# Patient Record
Sex: FEMALE | Race: WHITE | NOT HISPANIC OR LATINO | Employment: PART TIME
[De-identification: names, ages, dates, MRNs, and addresses within clinical notes are randomized per-mention and may not be internally consistent; named-entity substitution may affect disease eponyms.]

---

## 2014-09-15 LAB — HCV AB SER QL: NORMAL

## 2016-01-27 LAB — HM MAMMOGRAPHY BILATERAL: NORMAL

## 2017-10-18 ENCOUNTER — HOSPITAL (OUTPATIENT)
Dept: OTHER | Age: 67
End: 2017-10-18
Attending: PHYSICIAN ASSISTANT

## 2017-10-19 ENCOUNTER — DIAGNOSTIC TRANS (OUTPATIENT)
Dept: OTHER | Age: 67
End: 2017-10-19

## 2017-12-05 ENCOUNTER — OTHER- (OUTPATIENT)
Dept: RURAL CLINIC 1 | Facility: CLINIC | Age: 67
Setting detail: DERMATOLOGY
End: 2017-12-05

## 2017-12-05 DIAGNOSIS — L70.0 ACNE VULGARIS: ICD-10-CM

## 2017-12-05 PROCEDURE — 99202 OFFICE O/P NEW SF 15 MIN: CPT

## 2017-12-05 PROCEDURE — 17110 DESTRUCT B9 LESION 1-14: CPT

## 2018-04-11 ENCOUNTER — HOSPITAL (OUTPATIENT)
Dept: OTHER | Age: 68
End: 2018-04-11
Attending: FAMILY MEDICINE

## 2018-05-08 LAB — HPV16+18+45 E6+E7MRNA CVX NAA+PROBE: NEGATIVE

## 2018-05-11 LAB — CYTOLOGY CVX/VAG DOC THIN PREP: NORMAL

## 2018-05-14 LAB — NONINV COLON CA DNA+OCC BLD SCRN STL QL: NORMAL

## 2018-05-15 LAB — NONINV COLON CA DNA+OCC BLD SCRN STL QL: NEGATIVE

## 2018-06-28 RX ORDER — DOXYCYCLINE HYCLATE 100 MG/1
1 CAPSULE CAPSULE, GELATIN COATED ORAL BID
Qty: 60 | Refills: 1
Start: 2018-06-28

## 2018-06-28 RX ORDER — CLINDAMYCIN PHOSPHATE AND BENZOYL PEROXIDE 10; 25 MG/G; MG/G
1 GM GEL TOPICAL DAILY
Qty: 50 | Refills: 0
Start: 2018-06-28

## 2018-06-28 RX ORDER — SULFACETAMIDE SODIUM, SULFUR 100; 50 MG/G; MG/G
1 APPLICATION CREAM TOPICAL DAILY
Qty: 57 | Refills: 0
Start: 2018-06-28

## 2018-10-01 ENCOUNTER — HOSPITAL (OUTPATIENT)
Dept: OTHER | Age: 68
End: 2018-10-01
Attending: ORTHOPAEDIC SURGERY

## 2019-09-17 ENCOUNTER — OTHER- (OUTPATIENT)
Dept: RURAL CLINIC 1 | Facility: CLINIC | Age: 69
Setting detail: DERMATOLOGY
End: 2019-09-17

## 2019-09-17 DIAGNOSIS — C44.41 BASAL CELL CARCINOMA OF SKIN OF SCALP AND NECK: ICD-10-CM

## 2019-09-17 PROCEDURE — 17110 DESTRUCT B9 LESION 1-14: CPT

## 2019-09-17 PROCEDURE — 99213 OFFICE O/P EST LOW 20 MIN: CPT

## 2020-04-17 ENCOUNTER — HOSPITAL ENCOUNTER (EMERGENCY)
Age: 70
Discharge: HOME | End: 2020-04-17
Attending: EMERGENCY MEDICINE

## 2020-04-17 ENCOUNTER — APPOINTMENT (OUTPATIENT)
Dept: GENERAL RADIOLOGY | Age: 70
End: 2020-04-17
Attending: EMERGENCY MEDICINE

## 2020-04-17 VITALS
HEART RATE: 111 BPM | SYSTOLIC BLOOD PRESSURE: 119 MMHG | WEIGHT: 132 LBS | BODY MASS INDEX: 20.72 KG/M2 | TEMPERATURE: 101.7 F | HEIGHT: 67 IN | OXYGEN SATURATION: 98 % | DIASTOLIC BLOOD PRESSURE: 70 MMHG | RESPIRATION RATE: 20 BRPM

## 2020-04-17 DIAGNOSIS — J40 BRONCHITIS: Primary | ICD-10-CM

## 2020-04-17 LAB
ALBUMIN SERPL-MCNC: 3.7 G/DL (ref 3.6–5.1)
ALBUMIN/GLOB SERPL: 0.8 {RATIO} (ref 1–2.4)
ALP SERPL-CCNC: 103 UNITS/L (ref 45–117)
ALT SERPL-CCNC: 82 UNITS/L
ANION GAP SERPL CALC-SCNC: 11 MMOL/L (ref 10–20)
APTT PPP: 28 SEC (ref 22–32)
AST SERPL-CCNC: 98 UNITS/L
BASOPHILS # BLD: 0 K/MCL (ref 0–0.3)
BASOPHILS NFR BLD: 0 %
BILIRUB SERPL-MCNC: 0.4 MG/DL (ref 0.2–1)
BUN SERPL-MCNC: 17 MG/DL (ref 6–20)
BUN/CREAT SERPL: 16 (ref 7–25)
CALCIUM SERPL-MCNC: 9.2 MG/DL (ref 8.4–10.2)
CHLORIDE SERPL-SCNC: 100 MMOL/L (ref 98–107)
CO2 SERPL-SCNC: 31 MMOL/L (ref 21–32)
CREAT SERPL-MCNC: 1.06 MG/DL (ref 0.51–0.95)
DIFFERENTIAL METHOD BLD: ABNORMAL
EOSINOPHIL # BLD: 0 K/MCL (ref 0.1–0.5)
EOSINOPHIL NFR BLD: 0 %
ERYTHROCYTE [DISTWIDTH] IN BLOOD: 12.7 % (ref 11–15)
GLOBULIN SER-MCNC: 4.6 G/DL (ref 2–4)
GLUCOSE SERPL-MCNC: 115 MG/DL (ref 65–99)
HCT VFR BLD CALC: 41.2 % (ref 36–46.5)
HGB BLD-MCNC: 13.5 G/DL (ref 12–15.5)
IMM GRANULOCYTES # BLD AUTO: 0 K/MCL (ref 0–0.2)
IMM GRANULOCYTES NFR BLD: 0 %
INR PPP: 0.9
LACTATE BLDV-SCNC: 1.2 MMOL/L (ref 0–2)
LYMPHOCYTES # BLD: 0.6 K/MCL (ref 1–4)
LYMPHOCYTES NFR BLD: 12 %
MAGNESIUM SERPL-MCNC: 2.2 MG/DL (ref 1.7–2.4)
MCH RBC QN AUTO: 31.8 PG (ref 26–34)
MCHC RBC AUTO-ENTMCNC: 32.8 G/DL (ref 32–36.5)
MCV RBC AUTO: 96.9 FL (ref 78–100)
MONOCYTES # BLD: 0.2 K/MCL (ref 0.3–0.9)
MONOCYTES NFR BLD: 3 %
NEUTROPHILS # BLD: 4.5 K/MCL (ref 1.8–7.7)
NEUTROPHILS NFR BLD: 85 %
NRBC BLD MANUAL-RTO: 0 /100 WBC
PLATELET # BLD: 196 K/MCL (ref 140–450)
POTASSIUM SERPL-SCNC: 3.9 MMOL/L (ref 3.4–5.1)
PROT SERPL-MCNC: 8.3 G/DL (ref 6.4–8.2)
PROTHROMBIN TIME: 9.9 SEC (ref 9.7–11.8)
RBC # BLD: 4.25 MIL/MCL (ref 4–5.2)
SODIUM SERPL-SCNC: 138 MMOL/L (ref 135–145)
WBC # BLD: 5.4 K/MCL (ref 4.2–11)

## 2020-04-17 PROCEDURE — 10004651 HB RX, NO CHARGE ITEM: Performed by: EMERGENCY MEDICINE

## 2020-04-17 PROCEDURE — 85610 PROTHROMBIN TIME: CPT

## 2020-04-17 PROCEDURE — 85730 THROMBOPLASTIN TIME PARTIAL: CPT

## 2020-04-17 PROCEDURE — 80053 COMPREHEN METABOLIC PANEL: CPT

## 2020-04-17 PROCEDURE — 99284 EMERGENCY DEPT VISIT MOD MDM: CPT

## 2020-04-17 PROCEDURE — 83735 ASSAY OF MAGNESIUM: CPT

## 2020-04-17 PROCEDURE — 87040 BLOOD CULTURE FOR BACTERIA: CPT

## 2020-04-17 PROCEDURE — 83605 ASSAY OF LACTIC ACID: CPT

## 2020-04-17 PROCEDURE — 36415 COLL VENOUS BLD VENIPUNCTURE: CPT

## 2020-04-17 PROCEDURE — 85025 COMPLETE CBC W/AUTO DIFF WBC: CPT

## 2020-04-17 PROCEDURE — 71045 X-RAY EXAM CHEST 1 VIEW: CPT

## 2020-04-17 RX ORDER — ACETAMINOPHEN 500 MG
1000 TABLET ORAL ONCE
Status: COMPLETED | OUTPATIENT
Start: 2020-04-17 | End: 2020-04-17

## 2020-04-17 RX ORDER — NORTRIPTYLINE HYDROCHLORIDE 25 MG/1
25 CAPSULE ORAL NIGHTLY
COMMUNITY
End: 2021-02-15 | Stop reason: ALTCHOICE

## 2020-04-17 RX ADMIN — ACETAMINOPHEN 1000 MG: 500 TABLET ORAL at 13:59

## 2020-04-17 ASSESSMENT — ENCOUNTER SYMPTOMS
HALLUCINATIONS: 0
VOMITING: 0
TROUBLE SWALLOWING: 0
SHORTNESS OF BREATH: 1
DIARRHEA: 0
EYE PAIN: 0
DIZZINESS: 0
COUGH: 1
HEADACHES: 0
ADENOPATHY: 0
RHINORRHEA: 0
NAUSEA: 0
WEAKNESS: 0
CHILLS: 0
FATIGUE: 1
SORE THROAT: 0
FEVER: 1
NUMBNESS: 0

## 2020-04-22 LAB
BACTERIA BLD CULT: NORMAL
BACTERIA BLD CULT: NORMAL
REPORT STATUS (RPT): NORMAL
REPORT STATUS (RPT): NORMAL
SPECIMEN SOURCE: NORMAL
SPECIMEN SOURCE: NORMAL

## 2020-10-06 ENCOUNTER — SKIN CHECK (OUTPATIENT)
Dept: RURAL CLINIC 1 | Facility: CLINIC | Age: 70
Setting detail: DERMATOLOGY
End: 2020-10-06

## 2020-10-06 ENCOUNTER — RX ONLY (RX ONLY)
Age: 70
End: 2020-10-06

## 2020-10-06 DIAGNOSIS — L98.491 NON-PRESSURE CHRONIC ULCER OF SKIN OF OTHER SITES LIMITED TO BREAKDOWN OF SKIN: ICD-10-CM

## 2020-10-06 PROCEDURE — 99214 OFFICE O/P EST MOD 30 MIN: CPT

## 2020-10-06 PROCEDURE — 17110 DESTRUCT B9 LESION 1-14: CPT

## 2020-10-06 RX ORDER — CLOBETASOL PROPIONATE 0.5 MG/ML
1 A SMALL AMOUNT SOLUTION TOPICAL TWICE A DAY
Qty: 50 | Refills: 2
Start: 2020-10-06

## 2020-10-22 ENCOUNTER — EXTERNAL RECORD (OUTPATIENT)
Dept: HEALTH INFORMATION MANAGEMENT | Facility: OTHER | Age: 70
End: 2020-10-22

## 2020-11-04 ENCOUNTER — TELEPHONE (OUTPATIENT)
Dept: SCHEDULING | Age: 70
End: 2020-11-04

## 2020-11-04 DIAGNOSIS — N95.2 VAGINAL ATROPHY: Primary | ICD-10-CM

## 2020-11-05 RX ORDER — ESTRADIOL 0.1 MG/G
1 CREAM VAGINAL
Qty: 1 TUBE | Refills: 2 | Status: SHIPPED | OUTPATIENT
Start: 2020-11-05 | End: 2021-08-30 | Stop reason: SDUPTHER

## 2020-11-05 RX ORDER — ESTRADIOL 0.1 MG/G
1 CREAM VAGINAL
COMMUNITY
End: 2020-11-05 | Stop reason: SDUPTHER

## 2020-11-19 ENCOUNTER — WALK IN (OUTPATIENT)
Dept: URGENT CARE | Age: 70
End: 2020-11-19
Attending: FAMILY MEDICINE

## 2020-11-19 DIAGNOSIS — G89.29 CHRONIC NONINTRACTABLE HEADACHE, UNSPECIFIED HEADACHE TYPE: ICD-10-CM

## 2020-11-19 DIAGNOSIS — R51.9 CHRONIC NONINTRACTABLE HEADACHE, UNSPECIFIED HEADACHE TYPE: ICD-10-CM

## 2020-11-19 DIAGNOSIS — Z20.822 SUSPECTED COVID-19 VIRUS INFECTION: Primary | ICD-10-CM

## 2020-11-19 LAB — GLUCOSE BLDC GLUCOMTR-MCNC: 75 MG/DL (ref 70–99)

## 2020-11-19 PROCEDURE — U0003 INFECTIOUS AGENT DETECTION BY NUCLEIC ACID (DNA OR RNA); SEVERE ACUTE RESPIRATORY SYNDROME CORONAVIRUS 2 (SARS-COV-2) (CORONAVIRUS DISEASE [COVID-19]), AMPLIFIED PROBE TECHNIQUE, MAKING USE OF HIGH THROUGHPUT TECHNOLOGIES AS DESCRIBED BY CMS-2020-01-R: HCPCS

## 2020-11-19 PROCEDURE — C9803 HOPD COVID-19 SPEC COLLECT: HCPCS

## 2020-11-19 PROCEDURE — 82962 GLUCOSE BLOOD TEST: CPT

## 2020-11-19 PROCEDURE — 99212 OFFICE O/P EST SF 10 MIN: CPT

## 2020-11-19 ASSESSMENT — ENCOUNTER SYMPTOMS
HEADACHES: 0
FEVER: 0
SORE THROAT: 0
SHORTNESS OF BREATH: 0
CHILLS: 0
COUGH: 0
ABDOMINAL PAIN: 0
DIZZINESS: 0
BLURRED VISION: 0
VOMITING: 0
DIARRHEA: 0
NAUSEA: 0

## 2020-11-19 ASSESSMENT — PAIN SCALES - GENERAL: PAINLEVEL: 0

## 2020-11-20 ENCOUNTER — TELEPHONE (OUTPATIENT)
Dept: SCHEDULING | Age: 70
End: 2020-11-20

## 2020-11-22 LAB
SARS-COV-2 RNA RESP QL NAA+PROBE: NOT DETECTED
SPECIMEN SOURCE: NORMAL

## 2020-12-08 ENCOUNTER — TELEPHONE (OUTPATIENT)
Dept: FAMILY MEDICINE | Age: 70
End: 2020-12-08

## 2021-01-01 ENCOUNTER — EXTERNAL RECORD (OUTPATIENT)
Dept: HEALTH INFORMATION MANAGEMENT | Facility: OTHER | Age: 71
End: 2021-01-01

## 2021-01-01 ENCOUNTER — EXTERNAL RECORD (OUTPATIENT)
Dept: OTHER | Age: 71
End: 2021-01-01

## 2021-01-12 ENCOUNTER — WALK IN (OUTPATIENT)
Dept: URGENT CARE | Age: 71
End: 2021-01-12
Attending: EMERGENCY MEDICINE

## 2021-01-12 DIAGNOSIS — Z20.822 EXPOSURE TO COVID-19 VIRUS: Primary | ICD-10-CM

## 2021-01-12 LAB
SARS-COV-2 RNA RESP QL NAA+PROBE: NOT DETECTED
SERVICE CMNT-IMP: NORMAL
SERVICE CMNT-IMP: NORMAL

## 2021-01-12 PROCEDURE — U0003 INFECTIOUS AGENT DETECTION BY NUCLEIC ACID (DNA OR RNA); SEVERE ACUTE RESPIRATORY SYNDROME CORONAVIRUS 2 (SARS-COV-2) (CORONAVIRUS DISEASE [COVID-19]), AMPLIFIED PROBE TECHNIQUE, MAKING USE OF HIGH THROUGHPUT TECHNOLOGIES AS DESCRIBED BY CMS-2020-01-R: HCPCS | Performed by: NURSE PRACTITIONER

## 2021-01-12 PROCEDURE — 99212 OFFICE O/P EST SF 10 MIN: CPT

## 2021-01-12 PROCEDURE — C9803 HOPD COVID-19 SPEC COLLECT: HCPCS

## 2021-01-12 ASSESSMENT — ENCOUNTER SYMPTOMS
DEPRESSION: 0
CHILLS: 0
FEVER: 0
DIZZINESS: 0
WEIGHT LOSS: 0
COUGH: 0
ABDOMINAL PAIN: 0
SHORTNESS OF BREATH: 0
DIAPHORESIS: 0
HEADACHES: 0
VOMITING: 0
WEAKNESS: 0
NERVOUS/ANXIOUS: 0
NAUSEA: 0
EYE PAIN: 0
SORE THROAT: 0
DIARRHEA: 0

## 2021-01-12 ASSESSMENT — PAIN SCALES - GENERAL: PAINLEVEL: 0

## 2021-02-15 ENCOUNTER — V-VISIT (OUTPATIENT)
Dept: FAMILY MEDICINE | Age: 71
End: 2021-02-15

## 2021-02-15 DIAGNOSIS — M54.31 SCIATICA OF RIGHT SIDE: Primary | ICD-10-CM

## 2021-02-15 PROCEDURE — 99214 OFFICE O/P EST MOD 30 MIN: CPT | Performed by: PHYSICIAN ASSISTANT

## 2021-02-15 RX ORDER — METAXALONE 800 MG/1
800 TABLET ORAL 3 TIMES DAILY PRN
Qty: 30 TABLET | Refills: 0 | Status: SHIPPED | OUTPATIENT
Start: 2021-02-15 | End: 2021-02-15

## 2021-02-15 RX ORDER — TIZANIDINE 4 MG/1
4 TABLET ORAL EVERY 6 HOURS PRN
Qty: 30 TABLET | Refills: 0 | Status: SHIPPED | OUTPATIENT
Start: 2021-02-15 | End: 2021-06-24 | Stop reason: CLARIF

## 2021-02-15 RX ORDER — HYDROCHLOROTHIAZIDE 12.5 MG/1
12.5 TABLET ORAL DAILY
COMMUNITY
Start: 2020-12-11 | End: 2021-09-23 | Stop reason: SDUPTHER

## 2021-02-15 RX ORDER — CLONAZEPAM 1 MG/1
1 TABLET ORAL 2 TIMES DAILY PRN
COMMUNITY
Start: 2021-01-11 | End: 2021-07-09 | Stop reason: SDUPTHER

## 2021-02-15 RX ORDER — HYDROCODONE BITARTRATE AND ACETAMINOPHEN 5; 325 MG/1; MG/1
1 TABLET ORAL EVERY 6 HOURS PRN
Qty: 10 TABLET | Refills: 0 | Status: SHIPPED | OUTPATIENT
Start: 2021-02-15 | End: 2021-02-24 | Stop reason: SDUPTHER

## 2021-02-15 ASSESSMENT — PATIENT HEALTH QUESTIONNAIRE - PHQ9
2. FEELING DOWN, DEPRESSED OR HOPELESS: SEVERAL DAYS
CLINICAL INTERPRETATION OF PHQ2 SCORE: NO FURTHER SCREENING NEEDED
1. LITTLE INTEREST OR PLEASURE IN DOING THINGS: SEVERAL DAYS
SUM OF ALL RESPONSES TO PHQ9 QUESTIONS 1 AND 2: 2
SUM OF ALL RESPONSES TO PHQ9 QUESTIONS 1 AND 2: 2
CLINICAL INTERPRETATION OF PHQ9 SCORE: NO FURTHER SCREENING NEEDED

## 2021-02-15 ASSESSMENT — ENCOUNTER SYMPTOMS
CHILLS: 0
WEAKNESS: 0
BACK PAIN: 1
FEVER: 0
ROS GI COMMENTS: NO CHANGE IN BOWEL HABITS.
NUMBNESS: 0

## 2021-02-18 ENCOUNTER — IMMUNIZATION (OUTPATIENT)
Dept: LAB | Age: 71
End: 2021-02-18

## 2021-02-18 DIAGNOSIS — Z23 NEED FOR VACCINATION: Primary | ICD-10-CM

## 2021-02-18 PROCEDURE — 0001A COVID 19 PFIZER-BIONTECH: CPT

## 2021-02-18 PROCEDURE — 91300 COVID 19 PFIZER-BIONTECH: CPT

## 2021-02-22 ENCOUNTER — TELEPHONE (OUTPATIENT)
Dept: FAMILY MEDICINE | Age: 71
End: 2021-02-22

## 2021-02-24 DIAGNOSIS — M54.31 SCIATICA OF RIGHT SIDE: Primary | ICD-10-CM

## 2021-02-24 DIAGNOSIS — M54.31 SCIATICA OF RIGHT SIDE: ICD-10-CM

## 2021-02-24 RX ORDER — HYDROCODONE BITARTRATE AND ACETAMINOPHEN 5; 325 MG/1; MG/1
1 TABLET ORAL EVERY 6 HOURS PRN
Qty: 20 TABLET | Refills: 0 | Status: SHIPPED | OUTPATIENT
Start: 2021-02-24 | End: 2021-06-24 | Stop reason: SDUPTHER

## 2021-02-24 RX ORDER — METHYLPREDNISOLONE 4 MG/1
4 TABLET ORAL SEE ADMIN INSTRUCTIONS
Qty: 21 TABLET | Refills: 0 | Status: SHIPPED | OUTPATIENT
Start: 2021-02-24 | End: 2021-06-24 | Stop reason: CLARIF

## 2021-02-25 ENCOUNTER — TELEPHONE (OUTPATIENT)
Dept: FAMILY MEDICINE | Age: 71
End: 2021-02-25

## 2021-03-11 ENCOUNTER — IMMUNIZATION (OUTPATIENT)
Dept: LAB | Age: 71
End: 2021-03-11

## 2021-03-11 DIAGNOSIS — Z23 NEED FOR VACCINATION: Primary | ICD-10-CM

## 2021-03-11 PROCEDURE — 0002A COVID 19 PFIZER-BIONTECH: CPT

## 2021-03-11 PROCEDURE — 91300 COVID 19 PFIZER-BIONTECH: CPT

## 2021-05-25 VITALS
OXYGEN SATURATION: 97 % | HEART RATE: 88 BPM | HEIGHT: 65 IN | SYSTOLIC BLOOD PRESSURE: 119 MMHG | HEART RATE: 74 BPM | DIASTOLIC BLOOD PRESSURE: 88 MMHG | OXYGEN SATURATION: 97 % | WEIGHT: 134 LBS | SYSTOLIC BLOOD PRESSURE: 173 MMHG | RESPIRATION RATE: 16 BRPM | DIASTOLIC BLOOD PRESSURE: 76 MMHG | TEMPERATURE: 97.4 F | TEMPERATURE: 97.6 F | HEIGHT: 64 IN | WEIGHT: 133 LBS | BODY MASS INDEX: 22.88 KG/M2 | RESPIRATION RATE: 16 BRPM | BODY MASS INDEX: 22.16 KG/M2

## 2021-06-08 ENCOUNTER — TELEPHONE (OUTPATIENT)
Dept: SCHEDULING | Age: 71
End: 2021-06-08

## 2021-06-08 DIAGNOSIS — F33.0 MILD RECURRENT MAJOR DEPRESSION (CMD): Primary | ICD-10-CM

## 2021-06-08 RX ORDER — FLUOXETINE 20 MG/1
20 TABLET, FILM COATED ORAL DAILY
Qty: 30 TABLET | Refills: 0 | Status: SHIPPED | OUTPATIENT
Start: 2021-06-08 | End: 2021-06-24 | Stop reason: SDUPTHER

## 2021-06-24 ENCOUNTER — OFFICE VISIT (OUTPATIENT)
Dept: FAMILY MEDICINE | Age: 71
End: 2021-06-24

## 2021-06-24 VITALS
HEART RATE: 94 BPM | SYSTOLIC BLOOD PRESSURE: 114 MMHG | TEMPERATURE: 97.8 F | WEIGHT: 141 LBS | BODY MASS INDEX: 24.98 KG/M2 | DIASTOLIC BLOOD PRESSURE: 76 MMHG | HEIGHT: 63 IN

## 2021-06-24 DIAGNOSIS — F33.0 MILD RECURRENT MAJOR DEPRESSION (CMD): Primary | ICD-10-CM

## 2021-06-24 DIAGNOSIS — M54.31 SCIATICA OF RIGHT SIDE: ICD-10-CM

## 2021-06-24 PROCEDURE — 96127 BRIEF EMOTIONAL/BEHAV ASSMT: CPT | Performed by: PHYSICIAN ASSISTANT

## 2021-06-24 PROCEDURE — 99213 OFFICE O/P EST LOW 20 MIN: CPT | Performed by: PHYSICIAN ASSISTANT

## 2021-06-24 RX ORDER — HYDROCODONE BITARTRATE AND ACETAMINOPHEN 5; 325 MG/1; MG/1
1 TABLET ORAL EVERY 6 HOURS PRN
Qty: 20 TABLET | Refills: 0 | Status: SHIPPED | OUTPATIENT
Start: 2021-06-24 | End: 2021-09-23 | Stop reason: SDUPTHER

## 2021-06-24 RX ORDER — FLUOXETINE 20 MG/1
30 TABLET, FILM COATED ORAL DAILY
Qty: 45 TABLET | Refills: 11 | Status: SHIPPED | OUTPATIENT
Start: 2021-06-24 | End: 2021-09-23 | Stop reason: SDUPTHER

## 2021-06-24 ASSESSMENT — PAIN SCALES - GENERAL: PAINLEVEL: 6

## 2021-06-24 ASSESSMENT — PATIENT HEALTH QUESTIONNAIRE - PHQ9
6. FEELING BAD ABOUT YOURSELF - OR THAT YOU ARE A FAILURE OR HAVE LET YOURSELF OR YOUR FAMILY DOWN: NEARLY EVERY DAY
7. TROUBLE CONCENTRATING ON THINGS, SUCH AS READING THE NEWSPAPER OR WATCHING TELEVISION: MORE THAN HALF THE DAYS
SUM OF ALL RESPONSES TO PHQ QUESTIONS 1-9: 16
8. MOVING OR SPEAKING SO SLOWLY THAT OTHER PEOPLE COULD HAVE NOTICED. OR THE OPPOSITE, BEING SO FIGETY OR RESTLESS THAT YOU HAVE BEEN MOVING AROUND A LOT MORE THAN USUAL: NOT AT ALL
CLINICAL INTERPRETATION OF PHQ9 SCORE: FURTHER SCREENING NEEDED
CLINICAL INTERPRETATION OF PHQ9 SCORE: MODERATELY SEVERE DEPRESSION
9. THOUGHTS THAT YOU WOULD BE BETTER OFF DEAD, OR OF HURTING YOURSELF: SEVERAL DAYS
SUM OF ALL RESPONSES TO PHQ9 QUESTIONS 1 AND 2: 4
5. POOR APPETITE, WEIGHT LOSS, OR OVEREATING: NOT AT ALL
CLINICAL INTERPRETATION OF PHQ2 SCORE: FURTHER SCREENING NEEDED
SUM OF ALL RESPONSES TO PHQ9 QUESTIONS 1 AND 2: 4
10. IF YOU CHECKED OFF ANY PROBLEMS, HOW DIFFICULT HAVE THESE PROBLEMS MADE IT FOR YOU TO DO YOUR WORK, TAKE CARE OF THINGS AT HOME, OR GET ALONG WITH OTHER PEOPLE: NOT DIFFICULT AT ALL
1. LITTLE INTEREST OR PLEASURE IN DOING THINGS: MORE THAN HALF THE DAYS
SUM OF ALL RESPONSES TO PHQ9 QUESTIONS 1 TO 9: 16
3. TROUBLE FALLING OR STAYING ASLEEP OR SLEEPING TOO MUCH: NEARLY EVERY DAY
4. FEELING TIRED OR HAVING LITTLE ENERGY: NEARLY EVERY DAY
2. FEELING DOWN, DEPRESSED OR HOPELESS: MORE THAN HALF THE DAYS
CLINICAL INTERPRETATION OF PHQ2 SCORE: MODERATELY SEVERE DEPRESSION

## 2021-06-24 ASSESSMENT — ENCOUNTER SYMPTOMS
FEVER: 0
SORE THROAT: 0
NAUSEA: 0
COUGH: 0
VOMITING: 0
DIARRHEA: 0
BACK PAIN: 1
SHORTNESS OF BREATH: 0

## 2021-07-09 ENCOUNTER — TELEPHONE (OUTPATIENT)
Dept: FAMILY MEDICINE | Age: 71
End: 2021-07-09

## 2021-07-09 RX ORDER — CLONAZEPAM 1 MG/1
1 TABLET ORAL 2 TIMES DAILY PRN
Qty: 60 TABLET | Refills: 0 | Status: SHIPPED | OUTPATIENT
Start: 2021-07-09 | End: 2022-02-10

## 2021-08-04 DIAGNOSIS — I10 ESSENTIAL (PRIMARY) HYPERTENSION: Primary | ICD-10-CM

## 2021-08-04 RX ORDER — LISINOPRIL 20 MG/1
20 TABLET ORAL DAILY
Qty: 15 TABLET | Refills: 0 | Status: SHIPPED | OUTPATIENT
Start: 2021-08-04 | End: 2021-08-12 | Stop reason: SDUPTHER

## 2021-08-12 DIAGNOSIS — I10 ESSENTIAL (PRIMARY) HYPERTENSION: ICD-10-CM

## 2021-08-12 RX ORDER — LISINOPRIL 20 MG/1
20 TABLET ORAL DAILY
Qty: 15 TABLET | Refills: 0 | Status: SHIPPED | OUTPATIENT
Start: 2021-08-12 | End: 2021-08-24 | Stop reason: SDUPTHER

## 2021-08-24 DIAGNOSIS — I10 ESSENTIAL (PRIMARY) HYPERTENSION: ICD-10-CM

## 2021-08-24 RX ORDER — LISINOPRIL 20 MG/1
20 TABLET ORAL DAILY
Qty: 90 TABLET | Refills: 0 | Status: SHIPPED | OUTPATIENT
Start: 2021-08-24 | End: 2021-11-29 | Stop reason: SDUPTHER

## 2021-08-30 DIAGNOSIS — N95.2 VAGINAL ATROPHY: ICD-10-CM

## 2021-08-30 RX ORDER — ESTRADIOL 0.1 MG/G
1 CREAM VAGINAL
Qty: 8 G | Refills: 2 | Status: SHIPPED | OUTPATIENT
Start: 2021-08-30 | End: 2021-09-23 | Stop reason: SDUPTHER

## 2021-08-31 ENCOUNTER — TELEPHONE (OUTPATIENT)
Dept: FAMILY MEDICINE | Age: 71
End: 2021-08-31

## 2021-09-23 ENCOUNTER — OFFICE VISIT (OUTPATIENT)
Dept: FAMILY MEDICINE | Age: 71
End: 2021-09-23

## 2021-09-23 VITALS
DIASTOLIC BLOOD PRESSURE: 76 MMHG | TEMPERATURE: 97.2 F | SYSTOLIC BLOOD PRESSURE: 118 MMHG | BODY MASS INDEX: 23.05 KG/M2 | HEIGHT: 64 IN | WEIGHT: 135 LBS | HEART RATE: 85 BPM | RESPIRATION RATE: 16 BRPM

## 2021-09-23 DIAGNOSIS — I10 ESSENTIAL (PRIMARY) HYPERTENSION: ICD-10-CM

## 2021-09-23 DIAGNOSIS — N95.2 VAGINAL ATROPHY: ICD-10-CM

## 2021-09-23 DIAGNOSIS — Z79.899 DRUG THERAPY: ICD-10-CM

## 2021-09-23 DIAGNOSIS — F33.0 MILD RECURRENT MAJOR DEPRESSION (CMD): Primary | ICD-10-CM

## 2021-09-23 DIAGNOSIS — Z12.31 ENCOUNTER FOR SCREENING MAMMOGRAM FOR MALIGNANT NEOPLASM OF BREAST: ICD-10-CM

## 2021-09-23 DIAGNOSIS — M54.31 SCIATICA OF RIGHT SIDE: ICD-10-CM

## 2021-09-23 LAB
AMPHET UR QL SCN: NEGATIVE
BARBITURATES UR QL SCN: NEGATIVE
BENZODIAZ UR QL SCN: NEGATIVE
BUPRENORPHINE UR QL SCN: NEGATIVE
CANNABINOIDS UR QL SCN: NEGATIVE
COCAINE UR QL SCN: NEGATIVE
CREAT BLD-MCNC: 50 MG/DL
MDMA UR QL SCN: NEGATIVE
METHADONE UR QL SCN: NEGATIVE
METHAMPHET UR QL SCN: NEGATIVE
MORPHINE UR QL SCN: NEGATIVE
OPIATES UR QL SCN: NEGATIVE
OXYCODONE UR QL SCN: NEGATIVE
PCP UR QL SCN: NEGATIVE
PH UR: 5 [PH] (ref 5–7)
SP GR UR: 1.01 (ref 1–1.03)
TEMP UR: 92
TRICYCLICS UR QL SCN: NEGATIVE

## 2021-09-23 PROCEDURE — 99214 OFFICE O/P EST MOD 30 MIN: CPT | Performed by: PHYSICIAN ASSISTANT

## 2021-09-23 PROCEDURE — 80305 DRUG TEST PRSMV DIR OPT OBS: CPT | Performed by: PHYSICIAN ASSISTANT

## 2021-09-23 RX ORDER — ESTRADIOL 0.1 MG/G
1 CREAM VAGINAL
Qty: 24 G | Refills: 0 | Status: SHIPPED | OUTPATIENT
Start: 2021-09-23 | End: 2022-01-06 | Stop reason: SDUPTHER

## 2021-09-23 RX ORDER — HYDROCODONE BITARTRATE AND ACETAMINOPHEN 5; 325 MG/1; MG/1
1 TABLET ORAL EVERY 6 HOURS PRN
Qty: 20 TABLET | Refills: 0 | Status: SHIPPED | OUTPATIENT
Start: 2021-09-23 | End: 2022-01-06 | Stop reason: SDUPTHER

## 2021-09-23 RX ORDER — FLUOXETINE 20 MG/1
30 TABLET, FILM COATED ORAL DAILY
Qty: 135 TABLET | Refills: 3 | Status: SHIPPED | OUTPATIENT
Start: 2021-09-23 | End: 2022-01-06 | Stop reason: SDUPTHER

## 2021-09-23 RX ORDER — HYDROCHLOROTHIAZIDE 12.5 MG/1
12.5 TABLET ORAL DAILY
Qty: 90 TABLET | Refills: 0 | Status: SHIPPED | OUTPATIENT
Start: 2021-09-23 | End: 2022-01-06 | Stop reason: SDUPTHER

## 2021-09-23 ASSESSMENT — ENCOUNTER SYMPTOMS
SORE THROAT: 0
UNEXPECTED WEIGHT CHANGE: 0
SHORTNESS OF BREATH: 0
BACK PAIN: 1
COUGH: 0
FEVER: 0

## 2021-09-23 ASSESSMENT — COGNITIVE AND FUNCTIONAL STATUS - GENERAL
DO YOU HAVE DIFFICULTY DRESSING OR BATHING: NO
DO YOU HAVE SERIOUS DIFFICULTY WALKING OR CLIMBING STAIRS: NO
BECAUSE OF A PHYSICAL, MENTAL, OR EMOTIONAL CONDITION, DO YOU HAVE SERIOUS DIFFICULTY CONCENTRATING, REMEMBERING OR MAKING DECISIONS: NO
BECAUSE OF A PHYSICAL, MENTAL, OR EMOTIONAL CONDITION, DO YOU HAVE DIFFICULTY DOING ERRANDS ALONE: NO

## 2021-09-23 ASSESSMENT — PATIENT HEALTH QUESTIONNAIRE - PHQ9
SUM OF ALL RESPONSES TO PHQ9 QUESTIONS 1 AND 2: 0
2. FEELING DOWN, DEPRESSED OR HOPELESS: NOT AT ALL
1. LITTLE INTEREST OR PLEASURE IN DOING THINGS: NOT AT ALL
CLINICAL INTERPRETATION OF PHQ9 SCORE: NO FURTHER SCREENING NEEDED
CLINICAL INTERPRETATION OF PHQ2 SCORE: NO FURTHER SCREENING NEEDED
SUM OF ALL RESPONSES TO PHQ9 QUESTIONS 1 AND 2: 0

## 2021-11-29 DIAGNOSIS — I10 ESSENTIAL (PRIMARY) HYPERTENSION: ICD-10-CM

## 2021-11-29 RX ORDER — LISINOPRIL 20 MG/1
20 TABLET ORAL DAILY
Qty: 90 TABLET | Refills: 1 | Status: SHIPPED | OUTPATIENT
Start: 2021-11-29 | End: 2022-01-06 | Stop reason: SDUPTHER

## 2021-12-31 DIAGNOSIS — M54.31 SCIATICA OF RIGHT SIDE: ICD-10-CM

## 2021-12-31 RX ORDER — HYDROCODONE BITARTRATE AND ACETAMINOPHEN 5; 325 MG/1; MG/1
1 TABLET ORAL EVERY 6 HOURS PRN
Qty: 20 TABLET | Refills: 0 | Status: CANCELLED | OUTPATIENT
Start: 2021-12-31

## 2022-01-03 RX ORDER — HYDROCODONE BITARTRATE AND ACETAMINOPHEN 5; 325 MG/1; MG/1
1 TABLET ORAL EVERY 6 HOURS PRN
Qty: 20 TABLET | Refills: 0 | OUTPATIENT
Start: 2022-01-03

## 2022-01-06 ENCOUNTER — OFFICE VISIT (OUTPATIENT)
Dept: FAMILY MEDICINE | Age: 72
End: 2022-01-06

## 2022-01-06 VITALS
OXYGEN SATURATION: 98 % | HEIGHT: 64 IN | WEIGHT: 137 LBS | HEART RATE: 68 BPM | RESPIRATION RATE: 16 BRPM | BODY MASS INDEX: 23.39 KG/M2 | DIASTOLIC BLOOD PRESSURE: 84 MMHG | SYSTOLIC BLOOD PRESSURE: 124 MMHG

## 2022-01-06 DIAGNOSIS — R82.90 ABNORMAL URINE: ICD-10-CM

## 2022-01-06 DIAGNOSIS — N95.9 MENOPAUSAL DISORDER: ICD-10-CM

## 2022-01-06 DIAGNOSIS — E78.5 HYPERLIPIDEMIA, UNSPECIFIED HYPERLIPIDEMIA TYPE: ICD-10-CM

## 2022-01-06 DIAGNOSIS — I10 ESSENTIAL (PRIMARY) HYPERTENSION: ICD-10-CM

## 2022-01-06 DIAGNOSIS — Z12.11 SCREEN FOR COLON CANCER: ICD-10-CM

## 2022-01-06 DIAGNOSIS — Z23 IMMUNIZATION DUE: ICD-10-CM

## 2022-01-06 DIAGNOSIS — F33.0 MILD RECURRENT MAJOR DEPRESSION (CMD): ICD-10-CM

## 2022-01-06 DIAGNOSIS — Z79.899 DRUG THERAPY: ICD-10-CM

## 2022-01-06 DIAGNOSIS — M54.31 SCIATICA OF RIGHT SIDE: ICD-10-CM

## 2022-01-06 DIAGNOSIS — N95.2 VAGINAL ATROPHY: ICD-10-CM

## 2022-01-06 DIAGNOSIS — Z00.00 MEDICARE ANNUAL WELLNESS VISIT, SUBSEQUENT: Primary | ICD-10-CM

## 2022-01-06 LAB
AMPHET UR QL SCN: NEGATIVE
APPEARANCE, POC: ABNORMAL
BARBITURATES UR QL SCN: NEGATIVE
BENZODIAZ UR QL SCN: NEGATIVE
BILIRUBIN, POC: NEGATIVE
BUPRENORPHINE UR QL SCN: NEGATIVE
CANNABINOIDS UR QL SCN: POSITIVE
COCAINE UR QL SCN: NEGATIVE
COLOR, POC: YELLOW
CREAT BLD-MCNC: 50 MG/DL
GLUCOSE UR-MCNC: NEGATIVE MG/DL
INTERNAL PROCEDURAL CONTROLS ACCEPTABLE: YES
KETONES, POC: NEGATIVE MG/DL
MDMA UR QL SCN: NEGATIVE
METHADONE UR QL SCN: NEGATIVE
METHAMPHET UR QL SCN: NEGATIVE
MORPHINE UR QL SCN: NEGATIVE
NITRITE, POC: POSITIVE
OCCULT BLOOD, POC: ABNORMAL
OPIATES UR QL SCN: NEGATIVE
OXYCODONE UR QL SCN: NEGATIVE
PCP UR QL SCN: NEGATIVE
PH UR: 5 [PH] (ref 5–7)
PH UR: 5.5 [PH] (ref 5–7)
PROT UR-MCNC: NEGATIVE MG/DL
SP GR UR: 1.02 (ref 1–1.03)
SP GR UR: >= 1.03 (ref 1–1.03)
TEMP UR: 90
TRICYCLICS UR QL SCN: POSITIVE
UROBILINOGEN UR-MCNC: 0.2 MG/DL (ref 0–1)
WBC (LEUKOCYTE) ESTERASE, POC: NEGATIVE

## 2022-01-06 PROCEDURE — 87088 URINE BACTERIA CULTURE: CPT | Performed by: CLINICAL MEDICAL LABORATORY

## 2022-01-06 PROCEDURE — 80053 COMPREHEN METABOLIC PANEL: CPT | Performed by: CLINICAL MEDICAL LABORATORY

## 2022-01-06 PROCEDURE — 80061 LIPID PANEL: CPT | Performed by: CLINICAL MEDICAL LABORATORY

## 2022-01-06 PROCEDURE — 90471 IMMUNIZATION ADMIN: CPT | Performed by: PHYSICIAN ASSISTANT

## 2022-01-06 PROCEDURE — G0439 PPPS, SUBSEQ VISIT: HCPCS | Performed by: PHYSICIAN ASSISTANT

## 2022-01-06 PROCEDURE — 87086 URINE CULTURE/COLONY COUNT: CPT | Performed by: CLINICAL MEDICAL LABORATORY

## 2022-01-06 PROCEDURE — 87186 SC STD MICRODIL/AGAR DIL: CPT | Performed by: CLINICAL MEDICAL LABORATORY

## 2022-01-06 PROCEDURE — 90715 TDAP VACCINE 7 YRS/> IM: CPT | Performed by: PHYSICIAN ASSISTANT

## 2022-01-06 PROCEDURE — 80305 DRUG TEST PRSMV DIR OPT OBS: CPT | Performed by: CLINICAL MEDICAL LABORATORY

## 2022-01-06 PROCEDURE — 82274 ASSAY TEST FOR BLOOD FECAL: CPT | Performed by: CLINICAL MEDICAL LABORATORY

## 2022-01-06 PROCEDURE — 36415 COLL VENOUS BLD VENIPUNCTURE: CPT | Performed by: PHYSICIAN ASSISTANT

## 2022-01-06 RX ORDER — HYDROCODONE BITARTRATE AND ACETAMINOPHEN 5; 325 MG/1; MG/1
1 TABLET ORAL EVERY 6 HOURS PRN
Qty: 20 TABLET | Refills: 0 | Status: SHIPPED | OUTPATIENT
Start: 2022-01-06 | End: 2022-07-28 | Stop reason: SDUPTHER

## 2022-01-06 RX ORDER — LISINOPRIL 20 MG/1
20 TABLET ORAL DAILY
Qty: 90 TABLET | Refills: 3 | Status: SHIPPED | OUTPATIENT
Start: 2022-01-06 | End: 2023-01-05 | Stop reason: SDUPTHER

## 2022-01-06 RX ORDER — HYDROCHLOROTHIAZIDE 12.5 MG/1
12.5 TABLET ORAL DAILY
Qty: 90 TABLET | Refills: 3 | Status: SHIPPED | OUTPATIENT
Start: 2022-01-06 | End: 2023-01-05 | Stop reason: SDUPTHER

## 2022-01-06 RX ORDER — FLUOXETINE 20 MG/1
30 TABLET, FILM COATED ORAL DAILY
Qty: 135 TABLET | Refills: 3 | Status: SHIPPED | OUTPATIENT
Start: 2022-01-06 | End: 2023-01-05 | Stop reason: SDUPTHER

## 2022-01-06 RX ORDER — ESTRADIOL 0.1 MG/G
1 CREAM VAGINAL
Qty: 8 G | Refills: 2 | Status: SHIPPED | OUTPATIENT
Start: 2022-01-06 | End: 2022-07-28

## 2022-01-06 ASSESSMENT — ACTIVITIES OF DAILY LIVING (ADL)
RECENT_DECLINE_ADL: NO
ADL_SCORE: 12
SENSORY_SUPPORT_DEVICES: EYEGLASSES
ADL_BEFORE_ADMISSION: INDEPENDENT
NEEDS_ASSIST: NO
ADL_SHORT_OF_BREATH: NO

## 2022-01-06 ASSESSMENT — ANXIETY QUESTIONNAIRES
1. FEELING NERVOUS, ANXIOUS, OR ON EDGE: NOT AT ALL
7. FEELING AFRAID AS IF SOMETHING AWFUL MIGHT HAPPEN: 0
6. BECOMING EASILY ANNOYED OR IRRITABLE: NOT AT ALL
5. BEING SO RESTLESS THAT IT IS HARD TO SIT STILL: NOT AT ALL
2. NOT BEING ABLE TO STOP OR CONTROL WORRYING: NOT AT ALL
6. BECOMING EASILY ANNOYED OR IRRITABLE: 0
1. FEELING NERVOUS, ANXIOUS, OR ON EDGE: 0
3. WORRYING TOO MUCH ABOUT DIFFERENT THINGS: NOT AT ALL
5. BEING SO RESTLESS THAT IT IS HARD TO SIT STILL: 0
4. TROUBLE RELAXING: NOT AT ALL
3. WORRYING TOO MUCH ABOUT DIFFERENT THINGS: 0
2. NOT BEING ABLE TO STOP OR CONTROL WORRYING: 0
IF YOU CHECKED OFF ANY PROBLEMS ON THIS QUESTIONNAIRE, HOW DIFFICULT HAVE THESE PROBLEMS MADE IT FOR YOU TO DO YOUR WORK, TAKE CARE OF THINGS AT HOME, OR GET ALONG WITH OTHER PEOPLE: NOT DIFFICULT AT ALL
4. TROUBLE RELAXING: 0
7. FEELING AFRAID AS IF SOMETHING AWFUL MIGHT HAPPEN: NOT AT ALL
GAD7 TOTAL SCORE: 0

## 2022-01-06 ASSESSMENT — ENCOUNTER SYMPTOMS
DIZZINESS: 0
ABDOMINAL DISTENTION: 0
WEAKNESS: 0
DIARRHEA: 0
ADENOPATHY: 0
COLOR CHANGE: 0
SHORTNESS OF BREATH: 0
EYE PAIN: 0
WHEEZING: 0
NUMBNESS: 0
BRUISES/BLEEDS EASILY: 0
CONSTIPATION: 0
FEVER: 0
EYE REDNESS: 0
APPETITE CHANGE: 0
HEADACHES: 0
NERVOUS/ANXIOUS: 1
UNEXPECTED WEIGHT CHANGE: 0
NAUSEA: 0
BACK PAIN: 1
EYE DISCHARGE: 0
CHILLS: 0
FATIGUE: 0
POLYDIPSIA: 0
SLEEP DISTURBANCE: 0
SINUS PAIN: 0
VOMITING: 0
BLOOD IN STOOL: 0
ABDOMINAL PAIN: 0
POLYPHAGIA: 0
TREMORS: 0
COUGH: 0
SORE THROAT: 0

## 2022-01-06 ASSESSMENT — PATIENT HEALTH QUESTIONNAIRE - PHQ9
CLINICAL INTERPRETATION OF PHQ2 SCORE: NO FURTHER SCREENING NEEDED
1. LITTLE INTEREST OR PLEASURE IN DOING THINGS: NOT AT ALL
2. FEELING DOWN, DEPRESSED OR HOPELESS: SEVERAL DAYS
SUM OF ALL RESPONSES TO PHQ9 QUESTIONS 1 AND 2: 1
SUM OF ALL RESPONSES TO PHQ9 QUESTIONS 1 AND 2: 1

## 2022-01-06 ASSESSMENT — COGNITIVE AND FUNCTIONAL STATUS - GENERAL
BECAUSE OF A PHYSICAL, MENTAL, OR EMOTIONAL CONDITION, DO YOU HAVE DIFFICULTY DOING ERRANDS ALONE: NO
DO YOU HAVE SERIOUS DIFFICULTY WALKING OR CLIMBING STAIRS: NO
DO YOU HAVE DIFFICULTY DRESSING OR BATHING: NO
BECAUSE OF A PHYSICAL, MENTAL, OR EMOTIONAL CONDITION, DO YOU HAVE SERIOUS DIFFICULTY CONCENTRATING, REMEMBERING OR MAKING DECISIONS: NO

## 2022-01-06 ASSESSMENT — PAIN SCALES - PAIN ENJOYMENT GENERAL ACTIVITY SCALE (PEG)
INTERFERED_GENERAL_ACTIVITY: 3
PEG_TOTALSCORE: 3
INTERFERED_ENJOYMENT_LIFE: 3
AVG_PAIN_PASTWEEK: 3

## 2022-01-06 ASSESSMENT — MINI COG
PATIENT ABLE TO FILL IN THE CLOCK FACE WITH 10 MINUTES PAST 11 O'CLOCK?: YES, CLOCK IS CORRECT
TOTAL SCORE: 5
TOTAL SCORE: 5
PATIENT ABLE TO FILL IN THE CLOCK FACE WITH 10 MINUTES PAST 11 O'CLOCK?: YES, CLOCK IS CORRECT
PATIENT ABLE TO REPEAT THE 3 WORDS GIVEN PREVIOUSLY?: WAS ABLE TO REPEAT BACK 3 WORDS CORRECTLY
PATIENT WAS GIVEN REPEAT BACK WORDS FROM VERSION: 1 - BANANA SUNRISE CHAIR
PATIENT ABLE TO REPEAT THE 3 WORDS GIVEN PREVIOUSLY?: WAS ABLE TO REPEAT BACK 3 WORDS CORRECTLY
PATIENT WAS GIVEN REPEAT BACK WORDS FROM VERSION: 1 - BANANA SUNRISE CHAIR

## 2022-01-07 ENCOUNTER — TELEPHONE (OUTPATIENT)
Dept: FAMILY MEDICINE | Age: 72
End: 2022-01-07

## 2022-01-07 LAB
ALBUMIN SERPL-MCNC: 3.5 G/DL (ref 3.6–5.1)
ALBUMIN/GLOB SERPL: 0.9 {RATIO} (ref 1–2.4)
ALP SERPL-CCNC: 73 UNITS/L (ref 45–117)
ALT SERPL-CCNC: 25 UNITS/L
ANION GAP SERPL CALC-SCNC: 9 MMOL/L (ref 10–20)
AST SERPL-CCNC: 16 UNITS/L
BILIRUB SERPL-MCNC: 0.7 MG/DL (ref 0.2–1)
BUN SERPL-MCNC: 21 MG/DL (ref 6–20)
BUN/CREAT SERPL: 30 (ref 7–25)
CALCIUM SERPL-MCNC: 9.1 MG/DL (ref 8.4–10.2)
CHLORIDE SERPL-SCNC: 107 MMOL/L (ref 98–107)
CHOLEST SERPL-MCNC: 176 MG/DL
CHOLEST/HDLC SERPL: 2 {RATIO}
CO2 SERPL-SCNC: 28 MMOL/L (ref 21–32)
CREAT SERPL-MCNC: 0.71 MG/DL (ref 0.51–0.95)
FASTING DURATION TIME PATIENT: ABNORMAL H
FASTING DURATION TIME PATIENT: NORMAL H
GFR SERPLBLD BASED ON 1.73 SQ M-ARVRAT: 86 ML/MIN
GLOBULIN SER-MCNC: 3.7 G/DL (ref 2–4)
GLUCOSE SERPL-MCNC: 95 MG/DL (ref 70–99)
HDLC SERPL-MCNC: 88 MG/DL
LDLC SERPL CALC-MCNC: 75 MG/DL
NONHDLC SERPL-MCNC: 88 MG/DL
POTASSIUM SERPL-SCNC: 4.4 MMOL/L (ref 3.4–5.1)
PROT SERPL-MCNC: 7.2 G/DL (ref 6.4–8.2)
SODIUM SERPL-SCNC: 140 MMOL/L (ref 135–145)
TRIGL SERPL-MCNC: 67 MG/DL

## 2022-01-08 LAB — HEMOCCULT STL QL: NEGATIVE

## 2022-01-09 LAB — BACTERIA UR CULT: ABNORMAL

## 2022-01-10 ENCOUNTER — TELEPHONE (OUTPATIENT)
Dept: FAMILY MEDICINE | Age: 72
End: 2022-01-10

## 2022-01-10 DIAGNOSIS — B96.20 E-COLI UTI: Primary | ICD-10-CM

## 2022-01-10 DIAGNOSIS — R82.90 ABNORMAL URINE: ICD-10-CM

## 2022-01-10 DIAGNOSIS — N39.0 E-COLI UTI: Primary | ICD-10-CM

## 2022-01-10 RX ORDER — NITROFURANTOIN 25; 75 MG/1; MG/1
100 CAPSULE ORAL 2 TIMES DAILY
Qty: 14 CAPSULE | Refills: 0 | Status: SHIPPED | OUTPATIENT
Start: 2022-01-10 | End: 2022-07-28 | Stop reason: ALTCHOICE

## 2022-01-18 ENCOUNTER — TELEPHONE (OUTPATIENT)
Dept: FAMILY MEDICINE | Age: 72
End: 2022-01-18

## 2022-02-10 DIAGNOSIS — F41.9 ANXIETY: Primary | ICD-10-CM

## 2022-02-10 RX ORDER — CLONAZEPAM 1 MG/1
1 TABLET ORAL 2 TIMES DAILY PRN
Qty: 60 TABLET | Refills: 0 | Status: SHIPPED | OUTPATIENT
Start: 2022-02-10 | End: 2022-07-28 | Stop reason: SDUPTHER

## 2022-02-17 ENCOUNTER — HOSPITAL ENCOUNTER (OUTPATIENT)
Dept: MAMMOGRAPHY | Age: 72
Discharge: HOME | End: 2022-02-17
Attending: PHYSICIAN ASSISTANT

## 2022-02-17 ENCOUNTER — TELEPHONE (OUTPATIENT)
Dept: FAMILY MEDICINE | Age: 72
End: 2022-02-17

## 2022-02-17 DIAGNOSIS — N95.9 MENOPAUSAL DISORDER: ICD-10-CM

## 2022-02-17 DIAGNOSIS — R92.8 ABNORMAL MAMMOGRAM: Primary | ICD-10-CM

## 2022-02-17 DIAGNOSIS — M81.0 OSTEOPOROSIS, UNSPECIFIED OSTEOPOROSIS TYPE, UNSPECIFIED PATHOLOGICAL FRACTURE PRESENCE: Primary | ICD-10-CM

## 2022-02-17 DIAGNOSIS — Z12.31 ENCOUNTER FOR SCREENING MAMMOGRAM FOR MALIGNANT NEOPLASM OF BREAST: ICD-10-CM

## 2022-02-17 DIAGNOSIS — F41.9 ANXIETY: ICD-10-CM

## 2022-02-17 PROCEDURE — 77063 BREAST TOMOSYNTHESIS BI: CPT

## 2022-02-17 PROCEDURE — 77080 DXA BONE DENSITY AXIAL: CPT

## 2022-02-18 RX ORDER — CLONAZEPAM 1 MG/1
1 TABLET ORAL 2 TIMES DAILY PRN
Qty: 60 TABLET | Refills: 0 | OUTPATIENT
Start: 2022-02-18

## 2022-02-24 ENCOUNTER — HOSPITAL ENCOUNTER (OUTPATIENT)
Dept: ULTRASOUND IMAGING | Age: 72
Discharge: HOME | End: 2022-02-24
Attending: PHYSICIAN ASSISTANT

## 2022-02-24 ENCOUNTER — TELEPHONE (OUTPATIENT)
Dept: FAMILY MEDICINE | Age: 72
End: 2022-02-24

## 2022-02-24 ENCOUNTER — HOSPITAL ENCOUNTER (OUTPATIENT)
Dept: MAMMOGRAPHY | Age: 72
Discharge: HOME | End: 2022-02-24
Attending: PHYSICIAN ASSISTANT

## 2022-02-24 DIAGNOSIS — R92.8 ABNORMAL MAMMOGRAM: ICD-10-CM

## 2022-02-24 PROCEDURE — G0279 TOMOSYNTHESIS, MAMMO: HCPCS

## 2022-05-02 ENCOUNTER — TELEPHONE (OUTPATIENT)
Dept: FAMILY MEDICINE | Age: 72
End: 2022-05-02

## 2022-05-16 DIAGNOSIS — Z12.11 SCREEN FOR COLON CANCER: Primary | ICD-10-CM

## 2022-06-06 LAB — NONINV COLON CA DNA+OCC BLD SCRN STL QL: NORMAL

## 2022-06-07 LAB — NONINV COLON CA DNA+OCC BLD SCRN STL QL: NEGATIVE

## 2022-06-11 ENCOUNTER — TELEPHONE (OUTPATIENT)
Dept: FAMILY MEDICINE | Age: 72
End: 2022-06-11

## 2022-07-25 ENCOUNTER — TELEPHONE (OUTPATIENT)
Dept: FAMILY MEDICINE | Age: 72
End: 2022-07-25

## 2022-07-28 ENCOUNTER — OFFICE VISIT (OUTPATIENT)
Dept: FAMILY MEDICINE | Age: 72
End: 2022-07-28

## 2022-07-28 VITALS
TEMPERATURE: 97.3 F | HEART RATE: 105 BPM | SYSTOLIC BLOOD PRESSURE: 136 MMHG | OXYGEN SATURATION: 95 % | BODY MASS INDEX: 26.45 KG/M2 | DIASTOLIC BLOOD PRESSURE: 72 MMHG | WEIGHT: 151.7 LBS

## 2022-07-28 DIAGNOSIS — Z51.81 THERAPEUTIC DRUG MONITORING: ICD-10-CM

## 2022-07-28 DIAGNOSIS — F41.9 ANXIETY: ICD-10-CM

## 2022-07-28 DIAGNOSIS — M54.31 SCIATICA OF RIGHT SIDE: Primary | ICD-10-CM

## 2022-07-28 PROBLEM — S83.31XA: Status: ACTIVE | Noted: 2022-07-28

## 2022-07-28 PROBLEM — M17.9 OSTEOARTHRITIS OF KNEE: Status: ACTIVE | Noted: 2022-07-28

## 2022-07-28 LAB
AMPHET UR QL SCN: NEGATIVE
BARBITURATES UR QL SCN: NEGATIVE
BENZODIAZ UR QL SCN: NEGATIVE
BUPRENORPHINE UR QL SCN: NEGATIVE
CANNABINOIDS UR QL SCN: POSITIVE
COCAINE UR QL SCN: NEGATIVE
CREAT BLD-MCNC: 20 MG/DL
INTERNAL PROCEDURAL CONTROLS ACCEPTABLE: YES
MDMA UR QL SCN: NEGATIVE
METHADONE UR QL SCN: NEGATIVE
METHAMPHET UR QL SCN: NEGATIVE
MORPHINE UR QL SCN: NEGATIVE
OPIATES UR QL SCN: NEGATIVE
OXYCODONE UR QL SCN: NEGATIVE
PCP UR QL SCN: NEGATIVE
PH UR: 5 [PH] (ref 5–7)
SP GR UR: 1.02 (ref 1–1.03)
TEMP UR: 94
TRICYCLICS UR QL SCN: NEGATIVE

## 2022-07-28 PROCEDURE — 80305 DRUG TEST PRSMV DIR OPT OBS: CPT | Performed by: FAMILY MEDICINE

## 2022-07-28 PROCEDURE — 99213 OFFICE O/P EST LOW 20 MIN: CPT | Performed by: FAMILY MEDICINE

## 2022-07-28 RX ORDER — CLONAZEPAM 1 MG/1
1 TABLET ORAL 2 TIMES DAILY PRN
Qty: 60 TABLET | Refills: 0 | Status: SHIPPED
Start: 2022-07-28

## 2022-07-28 RX ORDER — HYDROCODONE BITARTRATE AND ACETAMINOPHEN 5; 325 MG/1; MG/1
1 TABLET ORAL EVERY 6 HOURS PRN
Qty: 20 TABLET | Refills: 0 | Status: SHIPPED | OUTPATIENT
Start: 2022-07-28 | End: 2022-08-23 | Stop reason: SDUPTHER

## 2022-07-28 ASSESSMENT — ENCOUNTER SYMPTOMS
SEIZURES: 0
ACTIVITY CHANGE: 0
TREMORS: 0
FEVER: 0
SPEECH DIFFICULTY: 0
DIAPHORESIS: 0
CHILLS: 0
DIZZINESS: 0
BACK PAIN: 1
APPETITE CHANGE: 0
FATIGUE: 0
NUMBNESS: 0
HEADACHES: 0
UNEXPECTED WEIGHT CHANGE: 0
WEAKNESS: 0

## 2022-08-08 ENCOUNTER — HOSPITAL ENCOUNTER (OUTPATIENT)
Dept: GENERAL RADIOLOGY | Age: 72
Discharge: HOME | End: 2022-08-08

## 2022-08-08 ENCOUNTER — TELEPHONE (OUTPATIENT)
Dept: FAMILY MEDICINE | Age: 72
End: 2022-08-08

## 2022-08-08 DIAGNOSIS — M51.36 DDD (DEGENERATIVE DISC DISEASE), LUMBAR: Primary | ICD-10-CM

## 2022-08-08 DIAGNOSIS — M43.9 COMPRESSION DEFORMITY OF VERTEBRA: ICD-10-CM

## 2022-08-08 DIAGNOSIS — M54.31 SCIATICA OF RIGHT SIDE: ICD-10-CM

## 2022-08-08 PROBLEM — M51.369 DDD (DEGENERATIVE DISC DISEASE), LUMBAR: Status: ACTIVE | Noted: 2022-08-08

## 2022-08-08 PROCEDURE — 72100 X-RAY EXAM L-S SPINE 2/3 VWS: CPT

## 2022-08-09 ENCOUNTER — TELEPHONE (OUTPATIENT)
Dept: FAMILY MEDICINE | Age: 72
End: 2022-08-09

## 2022-08-22 ENCOUNTER — TELEPHONE (OUTPATIENT)
Dept: FAMILY MEDICINE | Age: 72
End: 2022-08-22

## 2022-08-22 DIAGNOSIS — M54.31 SCIATICA OF RIGHT SIDE: ICD-10-CM

## 2022-08-22 RX ORDER — HYDROCODONE BITARTRATE AND ACETAMINOPHEN 5; 325 MG/1; MG/1
1 TABLET ORAL EVERY 6 HOURS PRN
Qty: 20 TABLET | Refills: 0 | Status: CANCELLED | OUTPATIENT
Start: 2022-08-22

## 2022-08-23 ENCOUNTER — TELEPHONE (OUTPATIENT)
Dept: FAMILY MEDICINE | Age: 72
End: 2022-08-23

## 2022-08-23 DIAGNOSIS — M54.31 SCIATICA OF RIGHT SIDE: ICD-10-CM

## 2022-08-23 RX ORDER — HYDROCODONE BITARTRATE AND ACETAMINOPHEN 5; 325 MG/1; MG/1
1 TABLET ORAL EVERY 6 HOURS PRN
Qty: 20 TABLET | Refills: 0 | Status: SHIPPED | OUTPATIENT
Start: 2022-08-23 | End: 2022-09-16 | Stop reason: SDUPTHER

## 2022-08-25 ENCOUNTER — APPOINTMENT (OUTPATIENT)
Dept: PHYSICAL MEDICINE AND REHAB | Age: 72
End: 2022-08-25
Attending: FAMILY MEDICINE

## 2022-08-25 ENCOUNTER — TELEPHONE (OUTPATIENT)
Dept: FAMILY MEDICINE | Age: 72
End: 2022-08-25

## 2022-08-25 ENCOUNTER — V-VISIT (OUTPATIENT)
Dept: FAMILY MEDICINE | Age: 72
End: 2022-08-25

## 2022-08-25 DIAGNOSIS — U07.1 COVID-19 VIRUS INFECTION: Primary | ICD-10-CM

## 2022-08-25 PROCEDURE — 99213 OFFICE O/P EST LOW 20 MIN: CPT | Performed by: PHYSICIAN ASSISTANT

## 2022-08-25 ASSESSMENT — ENCOUNTER SYMPTOMS
UNEXPECTED WEIGHT CHANGE: 0
ADENOPATHY: 0
FATIGUE: 1
SHORTNESS OF BREATH: 1
SINUS PRESSURE: 0
DIARRHEA: 1
TROUBLE SWALLOWING: 0
RHINORRHEA: 0
HEADACHES: 1
FEVER: 0
COUGH: 1
EYE REDNESS: 0
VOMITING: 1
SORE THROAT: 0
CHEST TIGHTNESS: 0
EYE DISCHARGE: 0
CHILLS: 0
APPETITE CHANGE: 0
EYE PAIN: 0
VOICE CHANGE: 0
SINUS PAIN: 0
BACK PAIN: 1

## 2022-08-26 ENCOUNTER — TELEPHONE (OUTPATIENT)
Dept: FAMILY MEDICINE | Age: 72
End: 2022-08-26

## 2022-09-01 ENCOUNTER — HOSPITAL ENCOUNTER (OUTPATIENT)
Dept: PHYSICAL MEDICINE AND REHAB | Age: 72
Discharge: STILL ADMITTED OR ADMINISTRATIVE | End: 2022-09-01
Attending: FAMILY MEDICINE

## 2022-09-01 PROCEDURE — 97110 THERAPEUTIC EXERCISES: CPT | Performed by: SPECIALIST

## 2022-09-01 PROCEDURE — 97161 PT EVAL LOW COMPLEX 20 MIN: CPT | Performed by: SPECIALIST

## 2022-09-01 ASSESSMENT — ENCOUNTER SYMPTOMS
PAIN SCALE AT HIGHEST: 8
PAIN SCALE AT LOWEST: 1
ALLEVIATING FACTORS: PRESCRIPTION MEDICATIONS
PAIN FREQUENCY: CONSTANT
QUALITY: ACHE
PAIN SEVERITY NOW: 3
PAIN DESCRIPTION: DULL

## 2022-09-08 ENCOUNTER — HOSPITAL ENCOUNTER (OUTPATIENT)
Dept: PHYSICAL MEDICINE AND REHAB | Age: 72
Discharge: STILL ADMITTED OR ADMINISTRATIVE | End: 2022-09-08
Attending: FAMILY MEDICINE

## 2022-09-08 PROCEDURE — 97140 MANUAL THERAPY 1/> REGIONS: CPT | Performed by: SPECIALIST

## 2022-09-08 PROCEDURE — 97110 THERAPEUTIC EXERCISES: CPT | Performed by: SPECIALIST

## 2022-09-15 ENCOUNTER — HOSPITAL ENCOUNTER (OUTPATIENT)
Dept: PHYSICAL MEDICINE AND REHAB | Age: 72
Discharge: STILL ADMITTED OR ADMINISTRATIVE | End: 2022-09-15
Attending: FAMILY MEDICINE

## 2022-09-15 PROCEDURE — 97110 THERAPEUTIC EXERCISES: CPT | Performed by: SPECIALIST

## 2022-09-15 PROCEDURE — 97530 THERAPEUTIC ACTIVITIES: CPT | Performed by: SPECIALIST

## 2022-09-15 PROCEDURE — 97140 MANUAL THERAPY 1/> REGIONS: CPT | Performed by: SPECIALIST

## 2022-09-16 ENCOUNTER — TELEPHONE (OUTPATIENT)
Dept: FAMILY MEDICINE | Age: 72
End: 2022-09-16

## 2022-09-16 DIAGNOSIS — M54.31 SCIATICA OF RIGHT SIDE: ICD-10-CM

## 2022-09-16 RX ORDER — HYDROCODONE BITARTRATE AND ACETAMINOPHEN 5; 325 MG/1; MG/1
1 TABLET ORAL EVERY 6 HOURS PRN
Qty: 20 TABLET | Refills: 0 | Status: SHIPPED | OUTPATIENT
Start: 2022-09-16 | End: 2022-10-20 | Stop reason: SDUPTHER

## 2022-09-22 ENCOUNTER — HOSPITAL ENCOUNTER (OUTPATIENT)
Dept: PHYSICAL MEDICINE AND REHAB | Age: 72
Discharge: STILL ADMITTED OR ADMINISTRATIVE | End: 2022-09-22
Attending: FAMILY MEDICINE

## 2022-09-22 PROCEDURE — 97530 THERAPEUTIC ACTIVITIES: CPT | Performed by: SPECIALIST

## 2022-09-22 PROCEDURE — 97140 MANUAL THERAPY 1/> REGIONS: CPT | Performed by: SPECIALIST

## 2022-09-29 ENCOUNTER — HOSPITAL ENCOUNTER (OUTPATIENT)
Dept: PHYSICAL MEDICINE AND REHAB | Age: 72
Discharge: STILL ADMITTED OR ADMINISTRATIVE | End: 2022-09-29
Attending: FAMILY MEDICINE

## 2022-09-29 PROCEDURE — 97110 THERAPEUTIC EXERCISES: CPT | Performed by: SPECIALIST

## 2022-09-29 PROCEDURE — 97530 THERAPEUTIC ACTIVITIES: CPT | Performed by: SPECIALIST

## 2022-09-29 PROCEDURE — 97140 MANUAL THERAPY 1/> REGIONS: CPT | Performed by: SPECIALIST

## 2022-10-13 ENCOUNTER — APPOINTMENT (OUTPATIENT)
Dept: PHYSICAL MEDICINE AND REHAB | Age: 72
End: 2022-10-13
Attending: FAMILY MEDICINE

## 2022-10-20 ENCOUNTER — APPOINTMENT (OUTPATIENT)
Dept: FAMILY MEDICINE | Age: 72
End: 2022-10-20

## 2022-10-20 ENCOUNTER — OFFICE VISIT (OUTPATIENT)
Dept: FAMILY MEDICINE | Age: 72
End: 2022-10-20

## 2022-10-20 VITALS
BODY MASS INDEX: 25.27 KG/M2 | SYSTOLIC BLOOD PRESSURE: 126 MMHG | WEIGHT: 148 LBS | DIASTOLIC BLOOD PRESSURE: 70 MMHG | HEART RATE: 102 BPM | HEIGHT: 64 IN | OXYGEN SATURATION: 97 % | TEMPERATURE: 96.2 F

## 2022-10-20 DIAGNOSIS — M54.31 SCIATICA OF RIGHT SIDE: ICD-10-CM

## 2022-10-20 DIAGNOSIS — M51.36 DDD (DEGENERATIVE DISC DISEASE), LUMBAR: Primary | ICD-10-CM

## 2022-10-20 LAB
AMPHET UR QL SCN: NEGATIVE
BARBITURATES UR QL SCN: NEGATIVE
BENZODIAZ UR QL SCN: NEGATIVE
BUPRENORPHINE UR QL SCN: NEGATIVE
CANNABINOIDS UR QL SCN: POSITIVE
COCAINE UR QL SCN: NEGATIVE
CREAT BLD-MCNC: 10 MG/DL
INTERNAL PROCEDURAL CONTROLS ACCEPTABLE: YES
MDMA UR QL SCN: NEGATIVE
METHADONE UR QL SCN: NEGATIVE
METHAMPHET UR QL SCN: NEGATIVE
MORPHINE UR QL SCN: POSITIVE
OPIATES UR QL SCN: NEGATIVE
OXYCODONE UR QL SCN: NEGATIVE
PCP UR QL SCN: NEGATIVE
PH UR: 5 [PH] (ref 5–7)
SP GR UR: 1.02 (ref 1–1.03)
TEMP UR: 92
TRICYCLICS UR QL SCN: NEGATIVE

## 2022-10-20 PROCEDURE — 80305 DRUG TEST PRSMV DIR OPT OBS: CPT | Performed by: FAMILY MEDICINE

## 2022-10-20 PROCEDURE — 99213 OFFICE O/P EST LOW 20 MIN: CPT | Performed by: FAMILY MEDICINE

## 2022-10-20 RX ORDER — MELOXICAM 15 MG/1
15 TABLET ORAL DAILY
Qty: 90 TABLET | Refills: 1 | Status: SHIPPED | OUTPATIENT
Start: 2022-10-20 | End: 2023-01-05

## 2022-10-20 RX ORDER — GABAPENTIN 300 MG/1
300 CAPSULE ORAL 3 TIMES DAILY
Qty: 60 CAPSULE | Refills: 3 | Status: SHIPPED | OUTPATIENT
Start: 2022-10-20 | End: 2023-01-05 | Stop reason: SDUPTHER

## 2022-10-20 RX ORDER — HYDROCODONE BITARTRATE AND ACETAMINOPHEN 5; 325 MG/1; MG/1
1 TABLET ORAL EVERY 6 HOURS PRN
Qty: 20 TABLET | Refills: 0 | Status: SHIPPED | OUTPATIENT
Start: 2022-10-20 | End: 2022-11-11 | Stop reason: SDUPTHER

## 2022-10-20 ASSESSMENT — ENCOUNTER SYMPTOMS
WHEEZING: 0
DIAPHORESIS: 0
BACK PAIN: 1
SHORTNESS OF BREATH: 0
APPETITE CHANGE: 0
FEVER: 0
FATIGUE: 0
CHILLS: 0
ACTIVITY CHANGE: 0
CHOKING: 0

## 2022-10-20 ASSESSMENT — PATIENT HEALTH QUESTIONNAIRE - PHQ9
SUM OF ALL RESPONSES TO PHQ9 QUESTIONS 1 AND 2: 0
SUM OF ALL RESPONSES TO PHQ9 QUESTIONS 1 AND 2: 0
2. FEELING DOWN, DEPRESSED OR HOPELESS: NOT AT ALL
1. LITTLE INTEREST OR PLEASURE IN DOING THINGS: NOT AT ALL
CLINICAL INTERPRETATION OF PHQ2 SCORE: NO FURTHER SCREENING NEEDED

## 2022-10-20 ASSESSMENT — PAIN SCALES - GENERAL: PAINLEVEL: 8

## 2022-11-09 ENCOUNTER — TELEPHONE (OUTPATIENT)
Dept: FAMILY MEDICINE | Age: 72
End: 2022-11-09

## 2022-11-09 DIAGNOSIS — M54.31 SCIATICA OF RIGHT SIDE: ICD-10-CM

## 2022-11-11 ENCOUNTER — E-ADVICE (OUTPATIENT)
Dept: FAMILY MEDICINE | Age: 72
End: 2022-11-11

## 2022-11-11 DIAGNOSIS — M54.31 SCIATICA OF RIGHT SIDE: Primary | ICD-10-CM

## 2022-11-11 RX ORDER — HYDROCODONE BITARTRATE AND ACETAMINOPHEN 5; 325 MG/1; MG/1
1 TABLET ORAL EVERY 6 HOURS PRN
Qty: 20 TABLET | Refills: 0 | Status: SHIPPED | OUTPATIENT
Start: 2022-11-11 | End: 2023-01-05 | Stop reason: ALTCHOICE

## 2022-12-07 ENCOUNTER — HOSPITAL ENCOUNTER (OUTPATIENT)
Dept: CT IMAGING | Age: 72
Discharge: HOME | End: 2022-12-07
Attending: ORTHOPAEDIC SURGERY

## 2022-12-07 DIAGNOSIS — M51.36 DEGENERATION, INTERVERTEBRAL DISC, LUMBAR: ICD-10-CM

## 2022-12-07 DIAGNOSIS — M48.062 LUMBAR STENOSIS WITH NEUROGENIC CLAUDICATION: ICD-10-CM

## 2022-12-07 DIAGNOSIS — M43.16 SPONDYLOLISTHESIS OF LUMBAR REGION: ICD-10-CM

## 2022-12-07 PROCEDURE — 72131 CT LUMBAR SPINE W/O DYE: CPT

## 2022-12-16 ENCOUNTER — EXTERNAL RECORD (OUTPATIENT)
Dept: HEALTH INFORMATION MANAGEMENT | Facility: OTHER | Age: 72
End: 2022-12-16

## 2022-12-19 ENCOUNTER — EXTERNAL RECORD (OUTPATIENT)
Dept: HEALTH INFORMATION MANAGEMENT | Facility: OTHER | Age: 72
End: 2022-12-19

## 2023-01-05 ENCOUNTER — OFFICE VISIT (OUTPATIENT)
Dept: FAMILY MEDICINE | Age: 73
End: 2023-01-05

## 2023-01-05 VITALS
OXYGEN SATURATION: 98 % | BODY MASS INDEX: 25.1 KG/M2 | SYSTOLIC BLOOD PRESSURE: 130 MMHG | WEIGHT: 147 LBS | HEIGHT: 64 IN | DIASTOLIC BLOOD PRESSURE: 85 MMHG | TEMPERATURE: 96.1 F | HEART RATE: 87 BPM

## 2023-01-05 DIAGNOSIS — R73.09 ELEVATED GLUCOSE: ICD-10-CM

## 2023-01-05 DIAGNOSIS — F33.0 MILD RECURRENT MAJOR DEPRESSION (CMD): ICD-10-CM

## 2023-01-05 DIAGNOSIS — Z12.31 ENCOUNTER FOR SCREENING MAMMOGRAM FOR MALIGNANT NEOPLASM OF BREAST: ICD-10-CM

## 2023-01-05 DIAGNOSIS — I10 ESSENTIAL (PRIMARY) HYPERTENSION: ICD-10-CM

## 2023-01-05 DIAGNOSIS — Z23 NEED FOR VACCINATION AGAINST STREPTOCOCCUS PNEUMONIAE: ICD-10-CM

## 2023-01-05 DIAGNOSIS — Z00.00 MEDICARE ANNUAL WELLNESS VISIT, SUBSEQUENT: Primary | ICD-10-CM

## 2023-01-05 DIAGNOSIS — E78.5 HYPERLIPIDEMIA, UNSPECIFIED HYPERLIPIDEMIA TYPE: ICD-10-CM

## 2023-01-05 DIAGNOSIS — M81.0 OSTEOPOROSIS, UNSPECIFIED OSTEOPOROSIS TYPE, UNSPECIFIED PATHOLOGICAL FRACTURE PRESENCE: ICD-10-CM

## 2023-01-05 DIAGNOSIS — M51.36 DDD (DEGENERATIVE DISC DISEASE), LUMBAR: ICD-10-CM

## 2023-01-05 LAB
APPEARANCE, POC: CLEAR
BILIRUBIN, POC: NEGATIVE
COLOR, POC: YELLOW
GLUCOSE UR-MCNC: NEGATIVE MG/DL
KETONES, POC: NEGATIVE MG/DL
NITRITE, POC: NEGATIVE
OCCULT BLOOD, POC: NEGATIVE
PH UR: 5.5 [PH] (ref 5–7)
PROT UR-MCNC: NEGATIVE MG/DL
SP GR UR: >= 1.03 (ref 1–1.03)
UROBILINOGEN UR-MCNC: 0.2 MG/DL (ref 0–1)
WBC (LEUKOCYTE) ESTERASE, POC: NEGATIVE

## 2023-01-05 PROCEDURE — 80061 LIPID PANEL: CPT | Performed by: CLINICAL MEDICAL LABORATORY

## 2023-01-05 PROCEDURE — 83036 HEMOGLOBIN GLYCOSYLATED A1C: CPT | Performed by: CLINICAL MEDICAL LABORATORY

## 2023-01-05 PROCEDURE — 80053 COMPREHEN METABOLIC PANEL: CPT | Performed by: CLINICAL MEDICAL LABORATORY

## 2023-01-05 PROCEDURE — 36415 COLL VENOUS BLD VENIPUNCTURE: CPT | Performed by: PHYSICIAN ASSISTANT

## 2023-01-05 PROCEDURE — 81003 URINALYSIS AUTO W/O SCOPE: CPT | Performed by: PHYSICIAN ASSISTANT

## 2023-01-05 PROCEDURE — G0439 PPPS, SUBSEQ VISIT: HCPCS | Performed by: PHYSICIAN ASSISTANT

## 2023-01-05 PROCEDURE — 90677 PCV20 VACCINE IM: CPT | Performed by: PHYSICIAN ASSISTANT

## 2023-01-05 PROCEDURE — G0009 ADMIN PNEUMOCOCCAL VACCINE: HCPCS | Performed by: PHYSICIAN ASSISTANT

## 2023-01-05 RX ORDER — FLUOXETINE 20 MG/1
30 TABLET, FILM COATED ORAL DAILY
Qty: 135 TABLET | Refills: 3 | Status: SHIPPED | OUTPATIENT
Start: 2023-01-05 | End: 2023-07-11 | Stop reason: SDUPTHER

## 2023-01-05 RX ORDER — GABAPENTIN 300 MG/1
300 CAPSULE ORAL 3 TIMES DAILY
Qty: 90 CAPSULE | Refills: 5 | Status: SHIPPED | OUTPATIENT
Start: 2023-01-05

## 2023-01-05 RX ORDER — LISINOPRIL 20 MG/1
20 TABLET ORAL DAILY
Qty: 90 TABLET | Refills: 3 | Status: SHIPPED | OUTPATIENT
Start: 2023-01-05 | End: 2023-03-01 | Stop reason: SDUPTHER

## 2023-01-05 RX ORDER — HYDROCHLOROTHIAZIDE 12.5 MG/1
12.5 TABLET ORAL DAILY
Qty: 90 TABLET | Refills: 3 | Status: SHIPPED | OUTPATIENT
Start: 2023-01-05

## 2023-01-05 RX ORDER — IBUPROFEN 600 MG/1
600 TABLET ORAL 3 TIMES DAILY PRN
Qty: 90 TABLET | Refills: 3 | Status: ON HOLD | OUTPATIENT
Start: 2023-01-05 | End: 2023-06-09 | Stop reason: HOSPADM

## 2023-01-05 ASSESSMENT — ACTIVITIES OF DAILY LIVING (ADL)
ADL_BEFORE_ADMISSION: INDEPENDENT
SENSORY_SUPPORT_DEVICES: EYEGLASSES
RECENT_DECLINE_ADL: NO
ADL_SCORE: 12
ADL_SHORT_OF_BREATH: NO
NEEDS_ASSIST: NO

## 2023-01-05 ASSESSMENT — ENCOUNTER SYMPTOMS
HEADACHES: 0
FATIGUE: 0
SLEEP DISTURBANCE: 0
WHEEZING: 0
DIZZINESS: 0
CONSTIPATION: 0
ABDOMINAL PAIN: 0
ADENOPATHY: 0
UNEXPECTED WEIGHT CHANGE: 0
FEVER: 0
NUMBNESS: 0
NERVOUS/ANXIOUS: 0
POLYDIPSIA: 0
CHILLS: 0
EYE PAIN: 0
BRUISES/BLEEDS EASILY: 0
EYE REDNESS: 0
WEAKNESS: 0
BLOOD IN STOOL: 0
EYE DISCHARGE: 0
COLOR CHANGE: 0
NAUSEA: 0
VOMITING: 0
BACK PAIN: 1
SHORTNESS OF BREATH: 0
TREMORS: 0
COUGH: 0
POLYPHAGIA: 0
APPETITE CHANGE: 0
SINUS PAIN: 0
ABDOMINAL DISTENTION: 0
DIARRHEA: 0
SORE THROAT: 0

## 2023-01-05 ASSESSMENT — PATIENT HEALTH QUESTIONNAIRE - PHQ9
2. FEELING DOWN, DEPRESSED OR HOPELESS: SEVERAL DAYS
4. FEELING TIRED OR HAVING LITTLE ENERGY: SEVERAL DAYS
5. POOR APPETITE, WEIGHT LOSS, OR OVEREATING: NOT AT ALL
CLINICAL INTERPRETATION OF PHQ9 SCORE: MINIMAL DEPRESSION
CLINICAL INTERPRETATION OF PHQ2 SCORE: NO FURTHER SCREENING NEEDED
SUM OF ALL RESPONSES TO PHQ QUESTIONS 1-9: 3
10. IF YOU CHECKED OFF ANY PROBLEMS, HOW DIFFICULT HAVE THESE PROBLEMS MADE IT FOR YOU TO DO YOUR WORK, TAKE CARE OF THINGS AT HOME, OR GET ALONG WITH OTHER PEOPLE: NOT DIFFICULT AT ALL
1. LITTLE INTEREST OR PLEASURE IN DOING THINGS: NOT AT ALL
9. THOUGHTS THAT YOU WOULD BE BETTER OFF DEAD, OR OF HURTING YOURSELF: NOT AT ALL
SUM OF ALL RESPONSES TO PHQ9 QUESTIONS 1 AND 2: 1
8. MOVING OR SPEAKING SO SLOWLY THAT OTHER PEOPLE COULD HAVE NOTICED. OR THE OPPOSITE, BEING SO FIGETY OR RESTLESS THAT YOU HAVE BEEN MOVING AROUND A LOT MORE THAN USUAL: NOT AT ALL
7. TROUBLE CONCENTRATING ON THINGS, SUCH AS READING THE NEWSPAPER OR WATCHING TELEVISION: NOT AT ALL
SUM OF ALL RESPONSES TO PHQ9 QUESTIONS 1 AND 2: 1
6. FEELING BAD ABOUT YOURSELF - OR THAT YOU ARE A FAILURE OR HAVE LET YOURSELF OR YOUR FAMILY DOWN: NOT AT ALL
3. TROUBLE FALLING OR STAYING ASLEEP OR SLEEPING TOO MUCH: SEVERAL DAYS

## 2023-01-05 ASSESSMENT — MINI COG
PATIENT ABLE TO FILL IN THE CLOCK FACE WITH 10 MINUTES PAST 11 O'CLOCK?: YES, CLOCK IS CORRECT
PATIENT WAS GIVEN REPEAT BACK WORDS FROM VERSION: 1 - BANANA SUNRISE CHAIR
PATIENT ABLE TO REPEAT THE 3 WORDS GIVEN PREVIOUSLY?: WAS ABLE TO REPEAT BACK 3 WORDS CORRECTLY
TOTAL SCORE: 5

## 2023-01-05 ASSESSMENT — COGNITIVE AND FUNCTIONAL STATUS - GENERAL
ARE YOU BLIND OR DO YOU HAVE SERIOUS DIFFICULTY SEEING, EVEN WHEN WEARING GLASSES: NO
ARE YOU DEAF OR DO YOU HAVE SERIOUS DIFFICULTY  HEARING: NO

## 2023-01-05 ASSESSMENT — PAIN SCALES - GENERAL: PAINLEVEL: 0

## 2023-01-06 ENCOUNTER — TELEPHONE (OUTPATIENT)
Dept: FAMILY MEDICINE | Age: 73
End: 2023-01-06

## 2023-01-06 LAB
ALBUMIN SERPL-MCNC: 3.6 G/DL (ref 3.6–5.1)
ALBUMIN/GLOB SERPL: 0.9 {RATIO} (ref 1–2.4)
ALP SERPL-CCNC: 68 UNITS/L (ref 45–117)
ALT SERPL-CCNC: 26 UNITS/L
ANION GAP SERPL CALC-SCNC: 10 MMOL/L (ref 7–19)
AST SERPL-CCNC: 17 UNITS/L
BILIRUB SERPL-MCNC: 0.7 MG/DL (ref 0.2–1)
BUN SERPL-MCNC: 32 MG/DL (ref 6–20)
BUN/CREAT SERPL: 47 (ref 7–25)
CALCIUM SERPL-MCNC: 9.2 MG/DL (ref 8.4–10.2)
CHLORIDE SERPL-SCNC: 103 MMOL/L (ref 97–110)
CHOLEST SERPL-MCNC: 234 MG/DL
CHOLEST/HDLC SERPL: 2.5 {RATIO}
CO2 SERPL-SCNC: 27 MMOL/L (ref 21–32)
CREAT SERPL-MCNC: 0.68 MG/DL (ref 0.51–0.95)
FASTING DURATION TIME PATIENT: ABNORMAL H
FASTING DURATION TIME PATIENT: ABNORMAL H
GFR SERPLBLD BASED ON 1.73 SQ M-ARVRAT: >90 ML/MIN
GLOBULIN SER-MCNC: 3.9 G/DL (ref 2–4)
GLUCOSE SERPL-MCNC: 100 MG/DL (ref 70–99)
HBA1C MFR BLD: 5.5 % (ref 4.5–5.6)
HDLC SERPL-MCNC: 93 MG/DL
LDLC SERPL CALC-MCNC: 108 MG/DL
NONHDLC SERPL-MCNC: 141 MG/DL
POTASSIUM SERPL-SCNC: 4 MMOL/L (ref 3.4–5.1)
PROT SERPL-MCNC: 7.5 G/DL (ref 6.4–8.2)
SODIUM SERPL-SCNC: 136 MMOL/L (ref 135–145)
TRIGL SERPL-MCNC: 167 MG/DL

## 2023-01-10 ENCOUNTER — TELEPHONE (OUTPATIENT)
Dept: FAMILY MEDICINE | Age: 73
End: 2023-01-10

## 2023-01-19 ENCOUNTER — EXTERNAL RECORD (OUTPATIENT)
Dept: HEALTH INFORMATION MANAGEMENT | Facility: OTHER | Age: 73
End: 2023-01-19

## 2023-01-26 ENCOUNTER — TELEPHONE (OUTPATIENT)
Dept: FAMILY MEDICINE | Age: 73
End: 2023-01-26

## 2023-02-28 ENCOUNTER — TELEPHONE (OUTPATIENT)
Dept: FAMILY MEDICINE | Age: 73
End: 2023-02-28

## 2023-02-28 DIAGNOSIS — I10 ESSENTIAL (PRIMARY) HYPERTENSION: ICD-10-CM

## 2023-03-01 RX ORDER — LISINOPRIL 20 MG/1
20 TABLET ORAL DAILY
Qty: 90 TABLET | Refills: 3 | Status: SHIPPED | OUTPATIENT
Start: 2023-03-01 | End: 2023-06-15 | Stop reason: SDUPTHER

## 2023-04-06 ENCOUNTER — HOSPITAL ENCOUNTER (OUTPATIENT)
Dept: CT IMAGING | Age: 73
Discharge: HOME | End: 2023-04-06
Attending: ORTHOPAEDIC SURGERY

## 2023-04-06 DIAGNOSIS — M47.816 SPONDYLOSIS OF LUMBAR REGION WITHOUT MYELOPATHY OR RADICULOPATHY: Primary | ICD-10-CM

## 2023-04-06 DIAGNOSIS — M47.816 SPONDYLOARTHROPATHY OF LUMBAR SPINE: ICD-10-CM

## 2023-04-06 DIAGNOSIS — M47.816 LUMBAR SPONDYLOSIS: ICD-10-CM

## 2023-04-06 DIAGNOSIS — M47.816 SPONDYLOSIS OF LUMBAR SPINE: ICD-10-CM

## 2023-04-07 ENCOUNTER — HOSPITAL ENCOUNTER (OUTPATIENT)
Dept: GENERAL RADIOLOGY | Age: 73
Discharge: HOME | End: 2023-04-07
Attending: ORTHOPAEDIC SURGERY

## 2023-04-07 ENCOUNTER — HOSPITAL ENCOUNTER (OUTPATIENT)
Dept: CT IMAGING | Age: 73
Discharge: HOME | End: 2023-04-07
Attending: ORTHOPAEDIC SURGERY

## 2023-04-07 DIAGNOSIS — M47.816 SPONDYLOARTHROPATHY OF LUMBAR SPINE: ICD-10-CM

## 2023-04-07 DIAGNOSIS — M47.816 SPONDYLOSIS OF LUMBAR REGION WITHOUT MYELOPATHY OR RADICULOPATHY: ICD-10-CM

## 2023-04-07 PROCEDURE — G1004 CDSM NDSC: HCPCS

## 2023-04-07 PROCEDURE — 72132 CT LUMBAR SPINE W/DYE: CPT

## 2023-04-07 PROCEDURE — 62304 MYELOGRAPHY LUMBAR INJECTION: CPT

## 2023-04-07 PROCEDURE — 10002805 HB CONTRAST AGENT: Performed by: ORTHOPAEDIC SURGERY

## 2023-04-07 RX ADMIN — IOHEXOL 20 ML: 180 INJECTION INTRAVENOUS at 13:58

## 2023-05-15 ASSESSMENT — ACTIVITIES OF DAILY LIVING (ADL)
ADL_SCORE: 12
HISTORY OF FALLING IN THE LAST YEAR (PRIOR TO ADMISSION): NO
SENSORY_SUPPORT_DEVICES: EYEGLASSES
ADL_SHORT_OF_BREATH: NO
ADL_BEFORE_ADMISSION: INDEPENDENT
NEEDS_ASSIST: NO

## 2023-05-25 ENCOUNTER — LAB SERVICES (OUTPATIENT)
Dept: LAB | Age: 73
End: 2023-05-25
Attending: ORTHOPAEDIC SURGERY

## 2023-05-25 ENCOUNTER — OFFICE VISIT (OUTPATIENT)
Dept: FAMILY MEDICINE | Age: 73
End: 2023-05-25

## 2023-05-25 VITALS
TEMPERATURE: 99.4 F | OXYGEN SATURATION: 96 % | BODY MASS INDEX: 24.75 KG/M2 | HEART RATE: 95 BPM | DIASTOLIC BLOOD PRESSURE: 80 MMHG | HEIGHT: 64 IN | WEIGHT: 145 LBS | SYSTOLIC BLOOD PRESSURE: 134 MMHG

## 2023-05-25 DIAGNOSIS — Z01.812 ENCOUNTER FOR PREPROCEDURAL LABORATORY EXAMINATION: Primary | ICD-10-CM

## 2023-05-25 DIAGNOSIS — Z01.818 PRE-OP EXAMINATION: Primary | ICD-10-CM

## 2023-05-25 DIAGNOSIS — M47.16 LUMBAR SPONDYLOSIS WITH MYELOPATHY: ICD-10-CM

## 2023-05-25 DIAGNOSIS — Z01.812 PRE-PROCEDURAL LABORATORY EXAMINATIONS: ICD-10-CM

## 2023-05-25 DIAGNOSIS — R73.09 ELEVATED GLUCOSE: ICD-10-CM

## 2023-05-25 PROBLEM — M70.62 TROCHANTERIC BURSITIS OF LEFT HIP: Status: ACTIVE | Noted: 2023-01-23

## 2023-05-25 PROBLEM — M54.30 SCIATICA: Status: ACTIVE | Noted: 2021-02-15

## 2023-05-25 PROBLEM — M54.16 LUMBAR RADICULOPATHY: Status: ACTIVE | Noted: 2023-01-23

## 2023-05-25 PROBLEM — M47.817 LUMBOSACRAL SPONDYLOSIS WITHOUT MYELOPATHY: Status: ACTIVE | Noted: 2023-01-23

## 2023-05-25 LAB
ABO + RH BLD: NORMAL
BLD GP AB SCN SERPL QL GEL: NEGATIVE
TYPE AND SCREEN EXPIRATION DATE: NORMAL

## 2023-05-25 PROCEDURE — 99214 OFFICE O/P EST MOD 30 MIN: CPT | Performed by: PHYSICIAN ASSISTANT

## 2023-05-25 PROCEDURE — 93000 ELECTROCARDIOGRAM COMPLETE: CPT | Performed by: PHYSICIAN ASSISTANT

## 2023-05-25 PROCEDURE — 80048 BASIC METABOLIC PNL TOTAL CA: CPT | Performed by: CLINICAL MEDICAL LABORATORY

## 2023-05-25 PROCEDURE — 85025 COMPLETE CBC W/AUTO DIFF WBC: CPT | Performed by: CLINICAL MEDICAL LABORATORY

## 2023-05-25 PROCEDURE — 86900 BLOOD TYPING SEROLOGIC ABO: CPT

## 2023-05-25 PROCEDURE — 36415 COLL VENOUS BLD VENIPUNCTURE: CPT | Performed by: PHYSICIAN ASSISTANT

## 2023-05-25 PROCEDURE — 83036 HEMOGLOBIN GLYCOSYLATED A1C: CPT | Performed by: CLINICAL MEDICAL LABORATORY

## 2023-05-25 PROCEDURE — 36415 COLL VENOUS BLD VENIPUNCTURE: CPT

## 2023-05-25 RX ORDER — TRAMADOL HYDROCHLORIDE 50 MG/1
TABLET ORAL
Status: ON HOLD | COMMUNITY
Start: 2023-05-09 | End: 2023-06-09 | Stop reason: HOSPADM

## 2023-05-25 ASSESSMENT — PATIENT HEALTH QUESTIONNAIRE - PHQ9
SUM OF ALL RESPONSES TO PHQ9 QUESTIONS 1 AND 2: 0
CLINICAL INTERPRETATION OF PHQ2 SCORE: NO FURTHER SCREENING NEEDED
2. FEELING DOWN, DEPRESSED OR HOPELESS: NOT AT ALL
SUM OF ALL RESPONSES TO PHQ9 QUESTIONS 1 AND 2: 0
1. LITTLE INTEREST OR PLEASURE IN DOING THINGS: NOT AT ALL

## 2023-05-26 LAB
ANION GAP SERPL CALC-SCNC: 9 MMOL/L (ref 7–19)
BASOPHILS # BLD: 0 K/MCL (ref 0–0.3)
BASOPHILS NFR BLD: 0 %
BUN SERPL-MCNC: 30 MG/DL (ref 6–20)
BUN/CREAT SERPL: 34 (ref 7–25)
CALCIUM SERPL-MCNC: 8.6 MG/DL (ref 8.4–10.2)
CHLORIDE SERPL-SCNC: 107 MMOL/L (ref 97–110)
CO2 SERPL-SCNC: 24 MMOL/L (ref 21–32)
CREAT SERPL-MCNC: 0.88 MG/DL (ref 0.51–0.95)
DEPRECATED RDW RBC: 43.9 FL (ref 39–50)
EOSINOPHIL # BLD: 0 K/MCL (ref 0–0.5)
EOSINOPHIL NFR BLD: 1 %
ERYTHROCYTE [DISTWIDTH] IN BLOOD: 12 % (ref 11–15)
FASTING DURATION TIME PATIENT: ABNORMAL H
GFR SERPLBLD BASED ON 1.73 SQ M-ARVRAT: 69 ML/MIN
GLUCOSE SERPL-MCNC: 158 MG/DL (ref 70–99)
HBA1C MFR BLD: 5.3 % (ref 4.5–5.6)
HCT VFR BLD CALC: 40.7 % (ref 36–46.5)
HGB BLD-MCNC: 13.1 G/DL (ref 12–15.5)
IMM GRANULOCYTES # BLD AUTO: 0 K/MCL (ref 0–0.2)
IMM GRANULOCYTES # BLD: 0 %
LYMPHOCYTES # BLD: 0.4 K/MCL (ref 1–4)
LYMPHOCYTES NFR BLD: 9 %
MCH RBC QN AUTO: 32.3 PG (ref 26–34)
MCHC RBC AUTO-ENTMCNC: 32.2 G/DL (ref 32–36.5)
MCV RBC AUTO: 100.2 FL (ref 78–100)
MONOCYTES # BLD: 0.8 K/MCL (ref 0.3–0.9)
MONOCYTES NFR BLD: 17 %
NEUTROPHILS # BLD: 3.4 K/MCL (ref 1.8–7.7)
NEUTROPHILS NFR BLD: 73 %
NRBC BLD MANUAL-RTO: 0 /100 WBC
PLATELET # BLD AUTO: 239 K/MCL (ref 140–450)
POTASSIUM SERPL-SCNC: 3.6 MMOL/L (ref 3.4–5.1)
RBC # BLD: 4.06 MIL/MCL (ref 4–5.2)
SODIUM SERPL-SCNC: 136 MMOL/L (ref 135–145)
WBC # BLD: 4.6 K/MCL (ref 4.2–11)

## 2023-05-30 ENCOUNTER — TELEPHONE (OUTPATIENT)
Dept: FAMILY MEDICINE | Age: 73
End: 2023-05-30

## 2023-06-06 ENCOUNTER — ANESTHESIA EVENT (OUTPATIENT)
Dept: SURGERY | Age: 73
DRG: 454 | End: 2023-06-06

## 2023-06-07 ENCOUNTER — APPOINTMENT (OUTPATIENT)
Dept: GENERAL RADIOLOGY | Age: 73
DRG: 454 | End: 2023-06-07
Attending: ORTHOPAEDIC SURGERY

## 2023-06-07 ENCOUNTER — ANESTHESIA (OUTPATIENT)
Dept: SURGERY | Age: 73
DRG: 454 | End: 2023-06-07

## 2023-06-07 ENCOUNTER — HOSPITAL ENCOUNTER (INPATIENT)
Age: 73
LOS: 3 days | Discharge: HOME | DRG: 454 | End: 2023-06-10
Attending: ORTHOPAEDIC SURGERY | Admitting: ORTHOPAEDIC SURGERY

## 2023-06-07 DIAGNOSIS — Z01.812 PRE-PROCEDURAL LABORATORY EXAMINATIONS: Primary | ICD-10-CM

## 2023-06-07 DIAGNOSIS — M43.16 SPONDYLOLISTHESIS OF LUMBAR REGION: ICD-10-CM

## 2023-06-07 PROCEDURE — 0SG0071 FUSION OF LUMBAR VERTEBRAL JOINT WITH AUTOLOGOUS TISSUE SUBSTITUTE, POSTERIOR APPROACH, POSTERIOR COLUMN, OPEN APPROACH: ICD-10-PCS | Performed by: ORTHOPAEDIC SURGERY

## 2023-06-07 PROCEDURE — 10000002 HB ROOM CHARGE MED SURG

## 2023-06-07 PROCEDURE — 10002807 HB RX 258: Performed by: ORTHOPAEDIC SURGERY

## 2023-06-07 PROCEDURE — 0SG00AJ FUSION OF LUMBAR VERTEBRAL JOINT WITH INTERBODY FUSION DEVICE, POSTERIOR APPROACH, ANTERIOR COLUMN, OPEN APPROACH: ICD-10-PCS | Performed by: ORTHOPAEDIC SURGERY

## 2023-06-07 PROCEDURE — C1713 ANCHOR/SCREW BN/BN,TIS/BN: HCPCS | Performed by: ORTHOPAEDIC SURGERY

## 2023-06-07 PROCEDURE — 10002800 HB RX 250 W HCPCS: Performed by: ANESTHESIOLOGY

## 2023-06-07 PROCEDURE — 13001086 HB INCENTIVE SPIROMETER W INSTRUCT

## 2023-06-07 PROCEDURE — 72100 X-RAY EXAM L-S SPINE 2/3 VWS: CPT

## 2023-06-07 PROCEDURE — 10002801 HB RX 250 W/O HCPCS: Performed by: ORTHOPAEDIC SURGERY

## 2023-06-07 PROCEDURE — 76000 FLUOROSCOPY <1 HR PHYS/QHP: CPT

## 2023-06-07 PROCEDURE — 10004451 HB PACU RECOVERY 1ST 30 MINUTES: Performed by: ORTHOPAEDIC SURGERY

## 2023-06-07 PROCEDURE — 0SB20ZZ EXCISION OF LUMBAR VERTEBRAL DISC, OPEN APPROACH: ICD-10-PCS | Performed by: ORTHOPAEDIC SURGERY

## 2023-06-07 PROCEDURE — 0SB40ZZ EXCISION OF LUMBOSACRAL DISC, OPEN APPROACH: ICD-10-PCS | Performed by: ORTHOPAEDIC SURGERY

## 2023-06-07 PROCEDURE — 13000112 HB NEURO MAJOR COMPLEX CASE S/U + 1ST 15 MIN: Performed by: ORTHOPAEDIC SURGERY

## 2023-06-07 PROCEDURE — 10002800 HB RX 250 W HCPCS: Performed by: ORTHOPAEDIC SURGERY

## 2023-06-07 PROCEDURE — 0SG3071 FUSION OF LUMBOSACRAL JOINT WITH AUTOLOGOUS TISSUE SUBSTITUTE, POSTERIOR APPROACH, POSTERIOR COLUMN, OPEN APPROACH: ICD-10-PCS | Performed by: ORTHOPAEDIC SURGERY

## 2023-06-07 PROCEDURE — 0SG30AJ FUSION OF LUMBOSACRAL JOINT WITH INTERBODY FUSION DEVICE, POSTERIOR APPROACH, ANTERIOR COLUMN, OPEN APPROACH: ICD-10-PCS | Performed by: ORTHOPAEDIC SURGERY

## 2023-06-07 PROCEDURE — 13000003 HB ANESTHESIA  GENERAL EA ADD MINUTE: Performed by: ORTHOPAEDIC SURGERY

## 2023-06-07 PROCEDURE — 10002801 HB RX 250 W/O HCPCS: Performed by: ANESTHESIOLOGY

## 2023-06-07 PROCEDURE — 10004452 HB PACU ADDL 30 MINUTES: Performed by: ORTHOPAEDIC SURGERY

## 2023-06-07 PROCEDURE — 10002016 HB COUNTER INCENTIVE SPIROMETRY

## 2023-06-07 PROCEDURE — 10004180 HB COUNTER-TRANSPORT

## 2023-06-07 PROCEDURE — 00NY0ZZ RELEASE LUMBAR SPINAL CORD, OPEN APPROACH: ICD-10-PCS | Performed by: ORTHOPAEDIC SURGERY

## 2023-06-07 PROCEDURE — 13000113 HB NEURO MAJOR COMPLEX CASE EA ADD MINUTE: Performed by: ORTHOPAEDIC SURGERY

## 2023-06-07 PROCEDURE — 10002803 HB RX 637: Performed by: ORTHOPAEDIC SURGERY

## 2023-06-07 PROCEDURE — 10002807 HB RX 258: Performed by: ANESTHESIOLOGY

## 2023-06-07 PROCEDURE — 13000002 HB ANESTHESIA  GENERAL  S/U + 1ST 15 MIN: Performed by: ORTHOPAEDIC SURGERY

## 2023-06-07 PROCEDURE — 10006027 HB SUPPLY 278: Performed by: ORTHOPAEDIC SURGERY

## 2023-06-07 PROCEDURE — 10006023 HB SUPPLY 272: Performed by: ORTHOPAEDIC SURGERY

## 2023-06-07 PROCEDURE — 10004651 HB RX, NO CHARGE ITEM: Performed by: ORTHOPAEDIC SURGERY

## 2023-06-07 DEVICE — GRAFT GRAFTON + 5ML DBM ALLOGRAFT PASTE SYRINGE BN: Type: IMPLANTABLE DEVICE | Site: SPINE LUMBAR | Status: FUNCTIONAL

## 2023-06-07 DEVICE — ROD SPNL 5.5MM 55MM CRV CD HZN TI NS CP4: Type: IMPLANTABLE DEVICE | Site: SPINE LUMBAR | Status: FUNCTIONAL

## 2023-06-07 DEVICE — SCREW SET CD HZN TI NS SPINE 5.56 MM ROD: Type: IMPLANTABLE DEVICE | Site: SPINE LUMBAR | Status: FUNCTIONAL

## 2023-06-07 DEVICE — INTERBODY FUSION DEVICE  4 DEGREE PLIF 9MM
Type: IMPLANTABLE DEVICE | Site: SPINE LUMBAR | Status: FUNCTIONAL
Brand: ENDOSKELETON® TO NANOLOCK® SURFACE TECHNOLOGY

## 2023-06-07 DEVICE — SCREW BN 6.5MM 50MM MULTIAXIAL CLR CD SPINE 5.56MM ROD: Type: IMPLANTABLE DEVICE | Site: SPINE LUMBAR | Status: FUNCTIONAL

## 2023-06-07 DEVICE — SCREW BN 6.5MM 45MM CD HZN SOLERA MULTIAXIAL TI COCR NS: Type: IMPLANTABLE DEVICE | Site: SPINE LUMBAR | Status: FUNCTIONAL

## 2023-06-07 DEVICE — SCREW BN 7.5MM 35MM CD HZN SOLERA MULTIAXIAL TI COCR NS: Type: IMPLANTABLE DEVICE | Site: SPINE LUMBAR | Status: FUNCTIONAL

## 2023-06-07 RX ORDER — HYDROCHLOROTHIAZIDE 12.5 MG/1
12.5 TABLET ORAL DAILY
Status: DISCONTINUED | OUTPATIENT
Start: 2023-06-08 | End: 2023-06-10 | Stop reason: HOSPADM

## 2023-06-07 RX ORDER — ONDANSETRON 2 MG/ML
4 INJECTION INTRAMUSCULAR; INTRAVENOUS
Status: ACTIVE | OUTPATIENT
Start: 2023-06-07 | End: 2023-06-07

## 2023-06-07 RX ORDER — NEOSTIGMINE METHYLSULFATE 4 MG/4 ML
SYRINGE (ML) INTRAVENOUS PRN
Status: DISCONTINUED | OUTPATIENT
Start: 2023-06-07 | End: 2023-06-07

## 2023-06-07 RX ORDER — ONDANSETRON 4 MG/1
4 TABLET, ORALLY DISINTEGRATING ORAL EVERY 4 HOURS PRN
Status: DISCONTINUED | OUTPATIENT
Start: 2023-06-07 | End: 2023-06-10 | Stop reason: HOSPADM

## 2023-06-07 RX ORDER — ONDANSETRON 2 MG/ML
INJECTION INTRAMUSCULAR; INTRAVENOUS PRN
Status: DISCONTINUED | OUTPATIENT
Start: 2023-06-07 | End: 2023-06-07

## 2023-06-07 RX ORDER — SODIUM CHLORIDE, SODIUM LACTATE, POTASSIUM CHLORIDE, CALCIUM CHLORIDE 600; 310; 30; 20 MG/100ML; MG/100ML; MG/100ML; MG/100ML
INJECTION, SOLUTION INTRAVENOUS CONTINUOUS
Status: DISCONTINUED | OUTPATIENT
Start: 2023-06-07 | End: 2023-06-07 | Stop reason: HOSPADM

## 2023-06-07 RX ORDER — CLONAZEPAM 1 MG/1
1 TABLET ORAL 2 TIMES DAILY PRN
Status: DISCONTINUED | OUTPATIENT
Start: 2023-06-07 | End: 2023-06-10 | Stop reason: HOSPADM

## 2023-06-07 RX ORDER — POLYETHYLENE GLYCOL 3350 17 G/17G
17 POWDER, FOR SOLUTION ORAL DAILY PRN
Status: DISCONTINUED | OUTPATIENT
Start: 2023-06-07 | End: 2023-06-10 | Stop reason: HOSPADM

## 2023-06-07 RX ORDER — DIPHENHYDRAMINE HYDROCHLORIDE 50 MG/ML
12.5 INJECTION INTRAMUSCULAR; INTRAVENOUS
Status: ACTIVE | OUTPATIENT
Start: 2023-06-07 | End: 2023-06-07

## 2023-06-07 RX ORDER — BISACODYL 10 MG
10 SUPPOSITORY, RECTAL RECTAL DAILY PRN
Status: DISCONTINUED | OUTPATIENT
Start: 2023-06-07 | End: 2023-06-10 | Stop reason: HOSPADM

## 2023-06-07 RX ORDER — METOCLOPRAMIDE 10 MG/1
5 TABLET ORAL EVERY 6 HOURS PRN
Status: DISCONTINUED | OUTPATIENT
Start: 2023-06-07 | End: 2023-06-10 | Stop reason: HOSPADM

## 2023-06-07 RX ORDER — CHLORHEXIDINE GLUCONATE ORAL RINSE 1.2 MG/ML
15 SOLUTION DENTAL EVERY 12 HOURS
Status: COMPLETED | OUTPATIENT
Start: 2023-06-07 | End: 2023-06-10

## 2023-06-07 RX ORDER — CYCLOBENZAPRINE HCL 10 MG
10 TABLET ORAL 3 TIMES DAILY PRN
Status: DISCONTINUED | OUTPATIENT
Start: 2023-06-07 | End: 2023-06-10 | Stop reason: HOSPADM

## 2023-06-07 RX ORDER — CALCIUM CARBONATE 500 MG/1
1000 TABLET, CHEWABLE ORAL EVERY 4 HOURS PRN
Status: DISCONTINUED | OUTPATIENT
Start: 2023-06-07 | End: 2023-06-10 | Stop reason: HOSPADM

## 2023-06-07 RX ORDER — OXYCODONE HYDROCHLORIDE 5 MG/1
10-15 TABLET ORAL EVERY 4 HOURS PRN
Status: DISCONTINUED | OUTPATIENT
Start: 2023-06-07 | End: 2023-06-10 | Stop reason: HOSPADM

## 2023-06-07 RX ORDER — SODIUM CHLORIDE, SODIUM LACTATE, POTASSIUM CHLORIDE, CALCIUM CHLORIDE 600; 310; 30; 20 MG/100ML; MG/100ML; MG/100ML; MG/100ML
INJECTION, SOLUTION INTRAVENOUS CONTINUOUS PRN
Status: DISCONTINUED | OUTPATIENT
Start: 2023-06-07 | End: 2023-06-07

## 2023-06-07 RX ORDER — MIDAZOLAM HYDROCHLORIDE 1 MG/ML
INJECTION, SOLUTION INTRAMUSCULAR; INTRAVENOUS PRN
Status: DISCONTINUED | OUTPATIENT
Start: 2023-06-07 | End: 2023-06-07

## 2023-06-07 RX ORDER — LISINOPRIL 20 MG/1
20 TABLET ORAL DAILY
Status: DISCONTINUED | OUTPATIENT
Start: 2023-06-08 | End: 2023-06-10 | Stop reason: HOSPADM

## 2023-06-07 RX ORDER — ROCURONIUM BROMIDE 10 MG/ML
INJECTION, SOLUTION INTRAVENOUS PRN
Status: DISCONTINUED | OUTPATIENT
Start: 2023-06-07 | End: 2023-06-07

## 2023-06-07 RX ORDER — METOCLOPRAMIDE HYDROCHLORIDE 5 MG/ML
5 INJECTION INTRAMUSCULAR; INTRAVENOUS
Status: ACTIVE | OUTPATIENT
Start: 2023-06-07 | End: 2023-06-07

## 2023-06-07 RX ORDER — CHLORHEXIDINE GLUCONATE ORAL RINSE 1.2 MG/ML
15 SOLUTION DENTAL
Status: COMPLETED | OUTPATIENT
Start: 2023-06-07 | End: 2023-06-07

## 2023-06-07 RX ORDER — AMOXICILLIN 250 MG
2 CAPSULE ORAL 2 TIMES DAILY
Status: DISCONTINUED | OUTPATIENT
Start: 2023-06-07 | End: 2023-06-10 | Stop reason: HOSPADM

## 2023-06-07 RX ORDER — OXYCODONE HYDROCHLORIDE 5 MG/1
5 TABLET ORAL EVERY 4 HOURS PRN
Status: DISCONTINUED | OUTPATIENT
Start: 2023-06-07 | End: 2023-06-10 | Stop reason: HOSPADM

## 2023-06-07 RX ORDER — CELECOXIB 200 MG/1
200 CAPSULE ORAL EVERY 12 HOURS SCHEDULED
Status: DISCONTINUED | OUTPATIENT
Start: 2023-06-07 | End: 2023-06-09

## 2023-06-07 RX ORDER — PHENYLEPHRINE HYDROCHLORIDE 10 MG/ML
INJECTION, SOLUTION INTRAMUSCULAR; INTRAVENOUS; SUBCUTANEOUS PRN
Status: DISCONTINUED | OUTPATIENT
Start: 2023-06-07 | End: 2023-06-07

## 2023-06-07 RX ORDER — SODIUM CHLORIDE 9 MG/ML
INJECTION, SOLUTION INTRAVENOUS CONTINUOUS
Status: DISCONTINUED | OUTPATIENT
Start: 2023-06-07 | End: 2023-06-08

## 2023-06-07 RX ORDER — GLYCOPYRROLATE 0.2 MG/ML
INJECTION, SOLUTION INTRAMUSCULAR; INTRAVENOUS PRN
Status: DISCONTINUED | OUTPATIENT
Start: 2023-06-07 | End: 2023-06-07

## 2023-06-07 RX ORDER — METOCLOPRAMIDE HYDROCHLORIDE 5 MG/ML
5 INJECTION INTRAMUSCULAR; INTRAVENOUS EVERY 6 HOURS PRN
Status: DISCONTINUED | OUTPATIENT
Start: 2023-06-07 | End: 2023-06-10 | Stop reason: HOSPADM

## 2023-06-07 RX ORDER — DEXAMETHASONE SODIUM PHOSPHATE 10 MG/ML
INJECTION, SOLUTION INTRAMUSCULAR; INTRAVENOUS PRN
Status: DISCONTINUED | OUTPATIENT
Start: 2023-06-07 | End: 2023-06-07

## 2023-06-07 RX ORDER — PROCHLORPERAZINE EDISYLATE 5 MG/ML
5 INJECTION INTRAMUSCULAR; INTRAVENOUS
Status: ACTIVE | OUTPATIENT
Start: 2023-06-07 | End: 2023-06-07

## 2023-06-07 RX ORDER — ONDANSETRON 2 MG/ML
4 INJECTION INTRAMUSCULAR; INTRAVENOUS EVERY 4 HOURS PRN
Status: DISCONTINUED | OUTPATIENT
Start: 2023-06-07 | End: 2023-06-10 | Stop reason: HOSPADM

## 2023-06-07 RX ORDER — GABAPENTIN 300 MG/1
300 CAPSULE ORAL 3 TIMES DAILY
Status: DISCONTINUED | OUTPATIENT
Start: 2023-06-07 | End: 2023-06-10 | Stop reason: HOSPADM

## 2023-06-07 RX ORDER — PROPOFOL 10 MG/ML
INJECTION, EMULSION INTRAVENOUS PRN
Status: DISCONTINUED | OUTPATIENT
Start: 2023-06-07 | End: 2023-06-07

## 2023-06-07 RX ORDER — ACETAMINOPHEN 500 MG
1000 TABLET ORAL EVERY 8 HOURS
Status: DISCONTINUED | OUTPATIENT
Start: 2023-06-07 | End: 2023-06-10 | Stop reason: HOSPADM

## 2023-06-07 RX ORDER — FLUOXETINE 10 MG/1
30 CAPSULE ORAL DAILY
Status: DISCONTINUED | OUTPATIENT
Start: 2023-06-07 | End: 2023-06-10 | Stop reason: HOSPADM

## 2023-06-07 RX ORDER — LIDOCAINE HYDROCHLORIDE 10 MG/ML
INJECTION, SOLUTION INFILTRATION; PERINEURAL PRN
Status: DISCONTINUED | OUTPATIENT
Start: 2023-06-07 | End: 2023-06-07

## 2023-06-07 RX ORDER — DIPHENHYDRAMINE HCL 25 MG
25 CAPSULE ORAL EVERY 4 HOURS PRN
Status: DISCONTINUED | OUTPATIENT
Start: 2023-06-07 | End: 2023-06-10 | Stop reason: HOSPADM

## 2023-06-07 RX ORDER — VANCOMYCIN HYDROCHLORIDE 1 G/20ML
INJECTION, POWDER, LYOPHILIZED, FOR SOLUTION INTRAVENOUS PRN
Status: DISCONTINUED | OUTPATIENT
Start: 2023-06-07 | End: 2023-06-07 | Stop reason: HOSPADM

## 2023-06-07 RX ADMIN — FENTANYL CITRATE 50 MCG: 50 INJECTION, SOLUTION INTRAMUSCULAR; INTRAVENOUS at 09:28

## 2023-06-07 RX ADMIN — CHLORHEXIDINE GLUCONATE 15 ML: 1.2 RINSE ORAL at 06:16

## 2023-06-07 RX ADMIN — SENNOSIDES AND DOCUSATE SODIUM 2 TABLET: 50; 8.6 TABLET ORAL at 21:33

## 2023-06-07 RX ADMIN — HYDROMORPHONE HYDROCHLORIDE 0.3 MG: 1 INJECTION, SOLUTION INTRAMUSCULAR; INTRAVENOUS; SUBCUTANEOUS at 12:07

## 2023-06-07 RX ADMIN — HYDROMORPHONE HYDROCHLORIDE 0.3 MG: 1 INJECTION, SOLUTION INTRAMUSCULAR; INTRAVENOUS; SUBCUTANEOUS at 12:25

## 2023-06-07 RX ADMIN — FENTANYL CITRATE 50 MCG: 50 INJECTION, SOLUTION INTRAMUSCULAR; INTRAVENOUS at 11:28

## 2023-06-07 RX ADMIN — PHENYLEPHRINE HYDROCHLORIDE 50 MCG: 10 INJECTION, SOLUTION INTRAVENOUS at 08:59

## 2023-06-07 RX ADMIN — CHLORHEXIDINE GLUCONATE 15 ML: 1.2 RINSE ORAL at 21:31

## 2023-06-07 RX ADMIN — ONDANSETRON 4 MG: 2 INJECTION INTRAMUSCULAR; INTRAVENOUS at 11:22

## 2023-06-07 RX ADMIN — ACETAMINOPHEN 1000 MG: 500 TABLET ORAL at 18:16

## 2023-06-07 RX ADMIN — OXYCODONE HYDROCHLORIDE 10 MG: 5 TABLET ORAL at 18:34

## 2023-06-07 RX ADMIN — GLYCOPYRROLATE 0.6 MG: 0.2 INJECTION, SOLUTION INTRAMUSCULAR; INTRAVENOUS at 11:38

## 2023-06-07 RX ADMIN — CYCLOBENZAPRINE HYDROCHLORIDE 10 MG: 10 TABLET, FILM COATED ORAL at 12:30

## 2023-06-07 RX ADMIN — ACETAMINOPHEN 1000 MG: 500 TABLET ORAL at 22:59

## 2023-06-07 RX ADMIN — ROCURONIUM BROMIDE 10 MG: 10 INJECTION, SOLUTION INTRAVENOUS at 08:32

## 2023-06-07 RX ADMIN — PHENYLEPHRINE HYDROCHLORIDE 50 MCG: 10 INJECTION, SOLUTION INTRAVENOUS at 08:47

## 2023-06-07 RX ADMIN — OXYCODONE HYDROCHLORIDE 10 MG: 5 TABLET ORAL at 22:59

## 2023-06-07 RX ADMIN — PROPOFOL 150 MG: 10 INJECTION, EMULSION INTRAVENOUS at 08:04

## 2023-06-07 RX ADMIN — MIDAZOLAM HYDROCHLORIDE 2 MG: 1 INJECTION, SOLUTION INTRAMUSCULAR; INTRAVENOUS at 08:01

## 2023-06-07 RX ADMIN — PROPOFOL 100 MCG/KG/MIN: 10 INJECTION, EMULSION INTRAVENOUS at 09:12

## 2023-06-07 RX ADMIN — ROCURONIUM BROMIDE 10 MG: 10 INJECTION, SOLUTION INTRAVENOUS at 08:59

## 2023-06-07 RX ADMIN — HYDROMORPHONE HYDROCHLORIDE 0.3 MG: 1 INJECTION, SOLUTION INTRAMUSCULAR; INTRAVENOUS; SUBCUTANEOUS at 12:35

## 2023-06-07 RX ADMIN — CEFAZOLIN SODIUM 2000 MG: 300 INJECTION, POWDER, LYOPHILIZED, FOR SOLUTION INTRAVENOUS at 11:24

## 2023-06-07 RX ADMIN — PROPOFOL 120 MCG/KG/MIN: 10 INJECTION, EMULSION INTRAVENOUS at 08:09

## 2023-06-07 RX ADMIN — LIDOCAINE HYDROCHLORIDE 30 MG: 10 INJECTION, SOLUTION INFILTRATION; PERINEURAL at 08:04

## 2023-06-07 RX ADMIN — ROCURONIUM BROMIDE 20 MG: 10 INJECTION, SOLUTION INTRAVENOUS at 09:28

## 2023-06-07 RX ADMIN — FENTANYL CITRATE 50 MCG: 50 INJECTION, SOLUTION INTRAMUSCULAR; INTRAVENOUS at 11:09

## 2023-06-07 RX ADMIN — CEFAZOLIN SODIUM 2000 MG: 300 INJECTION, POWDER, LYOPHILIZED, FOR SOLUTION INTRAVENOUS at 08:24

## 2023-06-07 RX ADMIN — ROCURONIUM BROMIDE 30 MG: 10 INJECTION, SOLUTION INTRAVENOUS at 08:05

## 2023-06-07 RX ADMIN — PHENYLEPHRINE HYDROCHLORIDE 100 MCG: 10 INJECTION, SOLUTION INTRAVENOUS at 09:12

## 2023-06-07 RX ADMIN — CELECOXIB 200 MG: 200 CAPSULE ORAL at 12:57

## 2023-06-07 RX ADMIN — NEOSTIGMINE METHYLSULFATE 5 MG: 1 INJECTION, SOLUTION INTRAVENOUS at 11:38

## 2023-06-07 RX ADMIN — CEFAZOLIN SODIUM 2000 MG: 300 INJECTION, POWDER, LYOPHILIZED, FOR SOLUTION INTRAVENOUS at 18:16

## 2023-06-07 RX ADMIN — OXYCODONE HYDROCHLORIDE 15 MG: 5 TABLET ORAL at 12:11

## 2023-06-07 RX ADMIN — FENTANYL CITRATE 50 MCG: 50 INJECTION, SOLUTION INTRAMUSCULAR; INTRAVENOUS at 08:35

## 2023-06-07 RX ADMIN — FLUOXETINE 30 MG: 10 CAPSULE ORAL at 18:17

## 2023-06-07 RX ADMIN — SODIUM CHLORIDE, POTASSIUM CHLORIDE, SODIUM LACTATE AND CALCIUM CHLORIDE: 600; 310; 30; 20 INJECTION, SOLUTION INTRAVENOUS at 07:59

## 2023-06-07 RX ADMIN — CEFAZOLIN SODIUM 2000 MG: 300 INJECTION, POWDER, LYOPHILIZED, FOR SOLUTION INTRAVENOUS at 21:32

## 2023-06-07 RX ADMIN — DEXAMETHASONE SODIUM PHOSPHATE 8 MG: 10 INJECTION INTRAMUSCULAR; INTRAVENOUS at 08:35

## 2023-06-07 RX ADMIN — CYCLOBENZAPRINE HYDROCHLORIDE 10 MG: 10 TABLET, FILM COATED ORAL at 18:34

## 2023-06-07 RX ADMIN — LABETALOL HYDROCHLORIDE 10 MG: 5 INJECTION, SOLUTION INTRAVENOUS at 11:35

## 2023-06-07 RX ADMIN — CELECOXIB 200 MG: 200 CAPSULE ORAL at 21:32

## 2023-06-07 RX ADMIN — FENTANYL CITRATE 100 MCG: 50 INJECTION, SOLUTION INTRAMUSCULAR; INTRAVENOUS at 08:04

## 2023-06-07 RX ADMIN — FENTANYL CITRATE 50 MCG: 50 INJECTION, SOLUTION INTRAMUSCULAR; INTRAVENOUS at 10:32

## 2023-06-07 RX ADMIN — HYDROMORPHONE HYDROCHLORIDE 0.3 MG: 1 INJECTION, SOLUTION INTRAMUSCULAR; INTRAVENOUS; SUBCUTANEOUS at 11:55

## 2023-06-07 RX ADMIN — GABAPENTIN 300 MG: 300 CAPSULE ORAL at 21:32

## 2023-06-07 SDOH — ECONOMIC STABILITY: TRANSPORTATION INSECURITY
IN THE PAST 12 MONTHS, HAS THE LACK OF TRANSPORTATION KEPT YOU FROM MEDICAL APPOINTMENTS OR FROM GETTING MEDICATIONS?: NO

## 2023-06-07 SDOH — ECONOMIC STABILITY: HOUSING INSECURITY: WHAT IS YOUR LIVING SITUATION TODAY?: FRIENDS

## 2023-06-07 SDOH — HEALTH STABILITY: PHYSICAL HEALTH: DO YOU HAVE DIFFICULTY DRESSING OR BATHING?: NO

## 2023-06-07 SDOH — ECONOMIC STABILITY: FOOD INSECURITY: HOW OFTEN IN THE PAST 12 MONTHS WERE YOU WORRIED OR STRESSED ABOUT HAVING ENOUGH MONEY TO BUY NUTRITIOUS MEALS?: NEVER

## 2023-06-07 SDOH — ECONOMIC STABILITY: TRANSPORTATION INSECURITY
IN THE PAST 12 MONTHS, HAS LACK OF TRANSPORTATION KEPT YOU FROM MEETINGS, WORK, OR FROM GETTING THINGS NEEDED FOR DAILY LIVING?: NO

## 2023-06-07 SDOH — HEALTH STABILITY: GENERAL: BECAUSE OF A PHYSICAL, MENTAL, OR EMOTIONAL CONDITION, DO YOU HAVE DIFFICULTY DOING ERRANDS ALONE?: NO

## 2023-06-07 SDOH — HEALTH STABILITY: GENERAL
BECAUSE OF A PHYSICAL, MENTAL, OR EMOTIONAL CONDITION, DO YOU HAVE SERIOUS DIFFICULTY CONCENTRATING, REMEMBERING OR MAKING DECISIONS?: NO

## 2023-06-07 SDOH — ECONOMIC STABILITY: HOUSING INSECURITY: ARE YOU WORRIED ABOUT LOSING YOUR HOUSING?: NO

## 2023-06-07 SDOH — SOCIAL STABILITY: SOCIAL NETWORK: SUPPORT SYSTEMS: FRIENDS

## 2023-06-07 SDOH — HEALTH STABILITY: PHYSICAL HEALTH: DO YOU HAVE SERIOUS DIFFICULTY WALKING OR CLIMBING STAIRS?: NO

## 2023-06-07 SDOH — ECONOMIC STABILITY: HOUSING INSECURITY: WHAT IS YOUR LIVING SITUATION TODAY?: HOUSE

## 2023-06-07 SDOH — ECONOMIC STABILITY: GENERAL

## 2023-06-07 SDOH — SOCIAL STABILITY: SOCIAL NETWORK
HOW OFTEN DO YOU SEE OR TALK TO PEOPLE THAT YOU CARE ABOUT AND FEEL CLOSE TO? (FOR EXAMPLE: TALKING TO FRIENDS ON THE PHONE, VISITING FRIENDS OR FAMILY, GOING TO CHURCH OR CLUB MEETINGS): 5 OR MORE TIMES A WEEK

## 2023-06-07 ASSESSMENT — PAIN SCALES - GENERAL
PAINLEVEL_OUTOF10: 0
PAINLEVEL_OUTOF10: 0
PAINLEVEL_OUTOF10: 2
PAINLEVEL_OUTOF10: 10
PAINLEVEL_OUTOF10: 10
PAINLEVEL_OUTOF10: 5
PAINLEVEL_OUTOF10: 3
PAINLEVEL_OUTOF10: 3
PAINLEVEL_OUTOF10: 5
PAINLEVEL_OUTOF10: 7
PAINLEVEL_OUTOF10: 1
PAINLEVEL_OUTOF10: 10
PAINLEVEL_OUTOF10: 10
PAINLEVEL_OUTOF10: 3
PAINLEVEL_OUTOF10: 6
PAINLEVEL_OUTOF10: 10
PAINLEVEL_OUTOF10: 3

## 2023-06-07 ASSESSMENT — PATIENT HEALTH QUESTIONNAIRE - PHQ9
IS PATIENT ABLE TO COMPLETE PHQ2 OR PHQ9: YES
SUM OF ALL RESPONSES TO PHQ9 QUESTIONS 1 AND 2: 0
CLINICAL INTERPRETATION OF PHQ2 SCORE: NO FURTHER SCREENING NEEDED

## 2023-06-07 ASSESSMENT — ACTIVITIES OF DAILY LIVING (ADL)
ADL_BEFORE_ADMISSION: INDEPENDENT
BATHING: NEEDS ASSISTANCE
DRESSING: NEEDS ASSISTANCE
FEEDING: INDEPENDENT
RECENT_DECLINE_ADL: NO
ADL_SCORE: 22
ADL_SHORT_OF_BREATH: NO
TOILETING: INDEPENDENT

## 2023-06-07 ASSESSMENT — ORIENTATION MEMORY CONCENTRATION TEST (OMCT)
SAY THE MONTHS IN REVERSE ORDER STARTING WITH LAST MONTH: CORRECT
OMCT SCORE: 0
REPEAT THE NAME AND ADDRESS I ASKED YOU TO REMEMBER: CORRECT
OMCT INTERPRETATION: 0-6: NO SIGNIFICANT IMPAIRMENT
WHAT MONTH IS IT NOW: CORRECT
WHAT YEAR IS IT NOW (MUST BE EXACT): CORRECT
COUNT BACKWARDS FROM 20 TO 1: CORRECT
WHAT TIME IS IT (NO WATCH OR CLOCK): CORRECT

## 2023-06-07 ASSESSMENT — LIFESTYLE VARIABLES
AUDIT-C TOTAL SCORE: 5
ALCOHOL_USE_STATUS: UNHEALTHY DRINKING IDENTIFIED. AUDIT C: 3 OR MORE FOR WOMEN AND 4 OR MORE FOR MEN.
HOW OFTEN DO YOU HAVE A DRINK CONTAINING ALCOHOL: 4 OR MORE TIMES PER WEEK
HOW OFTEN DO YOU HAVE 6 OR MORE DRINKS ON ONE OCCASION: LESS THAN MONTHLY
HOW MANY STANDARD DRINKS CONTAINING ALCOHOL DO YOU HAVE ON A TYPICAL DAY: 0,1 OR 2

## 2023-06-08 LAB
ANION GAP SERPL CALC-SCNC: 11 MMOL/L (ref 7–19)
BUN SERPL-MCNC: 25 MG/DL (ref 6–20)
BUN/CREAT SERPL: 23 (ref 7–25)
CALCIUM SERPL-MCNC: 8.1 MG/DL (ref 8.4–10.2)
CHLORIDE SERPL-SCNC: 105 MMOL/L (ref 97–110)
CO2 SERPL-SCNC: 26 MMOL/L (ref 21–32)
CREAT SERPL-MCNC: 1.09 MG/DL (ref 0.51–0.95)
FASTING DURATION TIME PATIENT: ABNORMAL H
GFR SERPLBLD BASED ON 1.73 SQ M-ARVRAT: 54 ML/MIN
GLUCOSE SERPL-MCNC: 110 MG/DL (ref 70–99)
HCT VFR BLD CALC: 27.9 % (ref 36–46.5)
HGB BLD-MCNC: 9.5 G/DL (ref 12–15.5)
POTASSIUM SERPL-SCNC: 3.4 MMOL/L (ref 3.4–5.1)
SODIUM SERPL-SCNC: 139 MMOL/L (ref 135–145)

## 2023-06-08 PROCEDURE — 97116 GAIT TRAINING THERAPY: CPT

## 2023-06-08 PROCEDURE — 10002016 HB COUNTER INCENTIVE SPIROMETRY

## 2023-06-08 PROCEDURE — 85018 HEMOGLOBIN: CPT | Performed by: ORTHOPAEDIC SURGERY

## 2023-06-08 PROCEDURE — 10004180 HB COUNTER-TRANSPORT

## 2023-06-08 PROCEDURE — 10002803 HB RX 637: Performed by: ORTHOPAEDIC SURGERY

## 2023-06-08 PROCEDURE — 10002807 HB RX 258: Performed by: ORTHOPAEDIC SURGERY

## 2023-06-08 PROCEDURE — 97161 PT EVAL LOW COMPLEX 20 MIN: CPT

## 2023-06-08 PROCEDURE — 10004651 HB RX, NO CHARGE ITEM: Performed by: ORTHOPAEDIC SURGERY

## 2023-06-08 PROCEDURE — 36415 COLL VENOUS BLD VENIPUNCTURE: CPT | Performed by: ORTHOPAEDIC SURGERY

## 2023-06-08 PROCEDURE — 10000002 HB ROOM CHARGE MED SURG

## 2023-06-08 PROCEDURE — 80048 BASIC METABOLIC PNL TOTAL CA: CPT | Performed by: ANESTHESIOLOGY

## 2023-06-08 PROCEDURE — 97530 THERAPEUTIC ACTIVITIES: CPT

## 2023-06-08 RX ADMIN — GABAPENTIN 300 MG: 300 CAPSULE ORAL at 19:58

## 2023-06-08 RX ADMIN — ACETAMINOPHEN 1000 MG: 500 TABLET ORAL at 18:16

## 2023-06-08 RX ADMIN — ACETAMINOPHEN 1000 MG: 500 TABLET ORAL at 09:59

## 2023-06-08 RX ADMIN — OXYCODONE HYDROCHLORIDE 10 MG: 5 TABLET ORAL at 09:59

## 2023-06-08 RX ADMIN — GABAPENTIN 300 MG: 300 CAPSULE ORAL at 09:58

## 2023-06-08 RX ADMIN — OXYCODONE HYDROCHLORIDE 10 MG: 5 TABLET ORAL at 14:26

## 2023-06-08 RX ADMIN — CELECOXIB 200 MG: 200 CAPSULE ORAL at 09:59

## 2023-06-08 RX ADMIN — LISINOPRIL 20 MG: 20 TABLET ORAL at 09:59

## 2023-06-08 RX ADMIN — CELECOXIB 200 MG: 200 CAPSULE ORAL at 19:58

## 2023-06-08 RX ADMIN — SODIUM CHLORIDE: 9 INJECTION, SOLUTION INTRAVENOUS at 06:03

## 2023-06-08 RX ADMIN — GABAPENTIN 300 MG: 300 CAPSULE ORAL at 14:28

## 2023-06-08 RX ADMIN — CHLORHEXIDINE GLUCONATE 15 ML: 1.2 RINSE ORAL at 06:03

## 2023-06-08 RX ADMIN — SENNOSIDES AND DOCUSATE SODIUM 2 TABLET: 50; 8.6 TABLET ORAL at 09:58

## 2023-06-08 RX ADMIN — CHLORHEXIDINE GLUCONATE 15 ML: 1.2 RINSE ORAL at 18:16

## 2023-06-08 RX ADMIN — FLUOXETINE 30 MG: 10 CAPSULE ORAL at 09:59

## 2023-06-08 RX ADMIN — HYDROCHLOROTHIAZIDE 12.5 MG: 12.5 TABLET ORAL at 09:58

## 2023-06-08 RX ADMIN — SENNOSIDES AND DOCUSATE SODIUM 2 TABLET: 50; 8.6 TABLET ORAL at 19:58

## 2023-06-08 RX ADMIN — OXYCODONE HYDROCHLORIDE 10 MG: 5 TABLET ORAL at 22:33

## 2023-06-08 ASSESSMENT — PAIN SCALES - GENERAL
PAINLEVEL_OUTOF10: 0
PAINLEVEL_OUTOF10: 3
PAINLEVEL_OUTOF10: 4
PAINLEVEL_OUTOF10: 3
PAINLEVEL_OUTOF10: 2
PAINLEVEL_OUTOF10: 5
PAINLEVEL_OUTOF10: 0

## 2023-06-08 ASSESSMENT — PAIN DESCRIPTION - PAIN TYPE
TYPE: ACUTE PAIN

## 2023-06-08 ASSESSMENT — COGNITIVE AND FUNCTIONAL STATUS - GENERAL
BASIC_MOBILITY_CONVERTED_SCORE: 50.88
BASIC_MOBILITY_RAW_SCORE: 23
BASIC_MOBILITY_CONVERTED_SCORE: 41.05
BASIC_MOBILITY_RAW_SCORE: 18

## 2023-06-08 ASSESSMENT — PAIN SCALES - WONG BAKER: WONGBAKER_NUMERICALRESPONSE: 3

## 2023-06-09 LAB
ANION GAP SERPL CALC-SCNC: 9 MMOL/L (ref 7–19)
BUN SERPL-MCNC: 37 MG/DL (ref 6–20)
BUN/CREAT SERPL: 35 (ref 7–25)
CALCIUM SERPL-MCNC: 8.8 MG/DL (ref 8.4–10.2)
CHLORIDE SERPL-SCNC: 107 MMOL/L (ref 97–110)
CO2 SERPL-SCNC: 27 MMOL/L (ref 21–32)
CREAT SERPL-MCNC: 1.05 MG/DL (ref 0.51–0.95)
FASTING DURATION TIME PATIENT: ABNORMAL H
GFR SERPLBLD BASED ON 1.73 SQ M-ARVRAT: 56 ML/MIN
GLUCOSE SERPL-MCNC: 95 MG/DL (ref 70–99)
HCT VFR BLD CALC: 27.3 % (ref 36–46.5)
HGB BLD-MCNC: 9.1 G/DL (ref 12–15.5)
POTASSIUM SERPL-SCNC: 3.3 MMOL/L (ref 3.4–5.1)
SODIUM SERPL-SCNC: 140 MMOL/L (ref 135–145)

## 2023-06-09 PROCEDURE — 97530 THERAPEUTIC ACTIVITIES: CPT

## 2023-06-09 PROCEDURE — 10002803 HB RX 637: Performed by: ORTHOPAEDIC SURGERY

## 2023-06-09 PROCEDURE — 10000002 HB ROOM CHARGE MED SURG

## 2023-06-09 PROCEDURE — 10004651 HB RX, NO CHARGE ITEM: Performed by: ORTHOPAEDIC SURGERY

## 2023-06-09 PROCEDURE — 97116 GAIT TRAINING THERAPY: CPT

## 2023-06-09 PROCEDURE — 36415 COLL VENOUS BLD VENIPUNCTURE: CPT | Performed by: ORTHOPAEDIC SURGERY

## 2023-06-09 PROCEDURE — 80048 BASIC METABOLIC PNL TOTAL CA: CPT | Performed by: ANESTHESIOLOGY

## 2023-06-09 PROCEDURE — 85018 HEMOGLOBIN: CPT | Performed by: ORTHOPAEDIC SURGERY

## 2023-06-09 RX ORDER — HYDROCODONE BITARTRATE AND ACETAMINOPHEN 10; 325 MG/1; MG/1
1 TABLET ORAL
Qty: 40 TABLET | Refills: 0 | Status: SHIPPED | OUTPATIENT
Start: 2023-06-09

## 2023-06-09 RX ORDER — CELECOXIB 200 MG/1
200 CAPSULE ORAL DAILY
Qty: 30 CAPSULE | Refills: 0 | Status: CANCELLED | OUTPATIENT
Start: 2023-06-09

## 2023-06-09 RX ORDER — BACLOFEN 10 MG/1
10 TABLET ORAL EVERY 8 HOURS PRN
Qty: 30 TABLET | Refills: 0 | Status: SHIPPED | OUTPATIENT
Start: 2023-06-09

## 2023-06-09 RX ADMIN — CELECOXIB 200 MG: 200 CAPSULE ORAL at 08:18

## 2023-06-09 RX ADMIN — FLUOXETINE 30 MG: 10 CAPSULE ORAL at 08:18

## 2023-06-09 RX ADMIN — ACETAMINOPHEN 1000 MG: 500 TABLET ORAL at 23:27

## 2023-06-09 RX ADMIN — GABAPENTIN 300 MG: 300 CAPSULE ORAL at 23:27

## 2023-06-09 RX ADMIN — OXYCODONE HYDROCHLORIDE 5 MG: 5 TABLET ORAL at 23:39

## 2023-06-09 RX ADMIN — OXYCODONE HYDROCHLORIDE 10 MG: 5 TABLET ORAL at 16:46

## 2023-06-09 RX ADMIN — GABAPENTIN 300 MG: 300 CAPSULE ORAL at 13:15

## 2023-06-09 RX ADMIN — GABAPENTIN 300 MG: 300 CAPSULE ORAL at 08:18

## 2023-06-09 RX ADMIN — ACETAMINOPHEN 1000 MG: 500 TABLET ORAL at 16:46

## 2023-06-09 RX ADMIN — CHLORHEXIDINE GLUCONATE 15 ML: 1.2 RINSE ORAL at 16:46

## 2023-06-09 RX ADMIN — LISINOPRIL 20 MG: 20 TABLET ORAL at 08:18

## 2023-06-09 RX ADMIN — OXYCODONE HYDROCHLORIDE 10 MG: 5 TABLET ORAL at 08:18

## 2023-06-09 RX ADMIN — ACETAMINOPHEN 1000 MG: 500 TABLET ORAL at 08:18

## 2023-06-09 RX ADMIN — CHLORHEXIDINE GLUCONATE 15 ML: 1.2 RINSE ORAL at 04:40

## 2023-06-09 RX ADMIN — ACETAMINOPHEN 1000 MG: 500 TABLET ORAL at 01:01

## 2023-06-09 RX ADMIN — HYDROCHLOROTHIAZIDE 12.5 MG: 12.5 TABLET ORAL at 08:18

## 2023-06-09 RX ADMIN — SENNOSIDES AND DOCUSATE SODIUM 2 TABLET: 50; 8.6 TABLET ORAL at 08:18

## 2023-06-09 ASSESSMENT — PAIN SCALES - GENERAL
PAINLEVEL_OUTOF10: 3
PAINLEVEL_OUTOF10: 3
PAINLEVEL_OUTOF10: 4
PAINLEVEL_OUTOF10: 2
PAINLEVEL_OUTOF10: 5

## 2023-06-09 ASSESSMENT — COGNITIVE AND FUNCTIONAL STATUS - GENERAL
BASIC_MOBILITY_CONVERTED_SCORE: 57.68
BASIC_MOBILITY_RAW_SCORE: 24

## 2023-06-09 ASSESSMENT — ENCOUNTER SYMPTOMS: PAIN SEVERITY NOW: 0

## 2023-06-09 ASSESSMENT — PAIN SCALES - WONG BAKER: WONGBAKER_NUMERICALRESPONSE: 3

## 2023-06-10 VITALS
WEIGHT: 145 LBS | RESPIRATION RATE: 16 BRPM | TEMPERATURE: 96.8 F | BODY MASS INDEX: 24.75 KG/M2 | DIASTOLIC BLOOD PRESSURE: 90 MMHG | SYSTOLIC BLOOD PRESSURE: 149 MMHG | HEIGHT: 64 IN | HEART RATE: 79 BPM | OXYGEN SATURATION: 97 %

## 2023-06-10 LAB
ANION GAP SERPL CALC-SCNC: 10 MMOL/L (ref 7–19)
BUN SERPL-MCNC: 26 MG/DL (ref 6–20)
BUN/CREAT SERPL: 34 (ref 7–25)
CALCIUM SERPL-MCNC: 8.7 MG/DL (ref 8.4–10.2)
CHLORIDE SERPL-SCNC: 108 MMOL/L (ref 97–110)
CO2 SERPL-SCNC: 28 MMOL/L (ref 21–32)
CREAT SERPL-MCNC: 0.77 MG/DL (ref 0.51–0.95)
FASTING DURATION TIME PATIENT: ABNORMAL H
GFR SERPLBLD BASED ON 1.73 SQ M-ARVRAT: 81 ML/MIN
GLUCOSE SERPL-MCNC: 100 MG/DL (ref 70–99)
HCT VFR BLD CALC: 28.1 % (ref 36–46.5)
HGB BLD-MCNC: 9.4 G/DL (ref 12–15.5)
POTASSIUM SERPL-SCNC: 4.2 MMOL/L (ref 3.4–5.1)
SODIUM SERPL-SCNC: 142 MMOL/L (ref 135–145)

## 2023-06-10 PROCEDURE — 85014 HEMATOCRIT: CPT | Performed by: ORTHOPAEDIC SURGERY

## 2023-06-10 PROCEDURE — 10002803 HB RX 637: Performed by: ORTHOPAEDIC SURGERY

## 2023-06-10 PROCEDURE — 80048 BASIC METABOLIC PNL TOTAL CA: CPT | Performed by: ANESTHESIOLOGY

## 2023-06-10 PROCEDURE — 10004651 HB RX, NO CHARGE ITEM: Performed by: ORTHOPAEDIC SURGERY

## 2023-06-10 PROCEDURE — 36415 COLL VENOUS BLD VENIPUNCTURE: CPT | Performed by: ANESTHESIOLOGY

## 2023-06-10 PROCEDURE — 13003289 HB OXYGEN THERAPY DAILY

## 2023-06-10 RX ADMIN — HYDROCHLOROTHIAZIDE 12.5 MG: 12.5 TABLET ORAL at 08:58

## 2023-06-10 RX ADMIN — CHLORHEXIDINE GLUCONATE 15 ML: 1.2 RINSE ORAL at 05:13

## 2023-06-10 RX ADMIN — GABAPENTIN 300 MG: 300 CAPSULE ORAL at 08:58

## 2023-06-10 RX ADMIN — OXYCODONE HYDROCHLORIDE 10 MG: 5 TABLET ORAL at 05:10

## 2023-06-10 RX ADMIN — ACETAMINOPHEN 1000 MG: 500 TABLET ORAL at 08:58

## 2023-06-10 RX ADMIN — FLUOXETINE 30 MG: 10 CAPSULE ORAL at 08:58

## 2023-06-10 RX ADMIN — LISINOPRIL 20 MG: 20 TABLET ORAL at 08:58

## 2023-06-10 RX ADMIN — CYCLOBENZAPRINE HYDROCHLORIDE 10 MG: 10 TABLET, FILM COATED ORAL at 08:58

## 2023-06-10 RX ADMIN — OXYCODONE HYDROCHLORIDE 10 MG: 5 TABLET ORAL at 11:19

## 2023-06-10 RX ADMIN — SENNOSIDES AND DOCUSATE SODIUM 2 TABLET: 50; 8.6 TABLET ORAL at 08:58

## 2023-06-10 ASSESSMENT — PAIN SCALES - GENERAL
PAINLEVEL_OUTOF10: 7
PAINLEVEL_OUTOF10: 6
PAINLEVEL_OUTOF10: 7
PAINLEVEL_OUTOF10: 7

## 2023-06-15 DIAGNOSIS — I10 ESSENTIAL (PRIMARY) HYPERTENSION: ICD-10-CM

## 2023-06-15 RX ORDER — LISINOPRIL 20 MG/1
20 TABLET ORAL DAILY
Qty: 90 TABLET | Refills: 3 | Status: SHIPPED | OUTPATIENT
Start: 2023-06-15 | End: 2023-07-11 | Stop reason: SDUPTHER

## 2023-06-18 ENCOUNTER — HOSPITAL ENCOUNTER (EMERGENCY)
Age: 73
Discharge: HOME | End: 2023-06-19
Attending: EMERGENCY MEDICINE

## 2023-06-18 ENCOUNTER — APPOINTMENT (OUTPATIENT)
Dept: GENERAL RADIOLOGY | Age: 73
End: 2023-06-18
Attending: EMERGENCY MEDICINE

## 2023-06-18 DIAGNOSIS — K59.00 CONSTIPATION, ACUTE: Primary | ICD-10-CM

## 2023-06-18 LAB
ALBUMIN SERPL-MCNC: 3.5 G/DL (ref 3.6–5.1)
ALBUMIN/GLOB SERPL: 0.8 {RATIO} (ref 1–2.4)
ALP SERPL-CCNC: 100 UNITS/L (ref 45–117)
ALT SERPL-CCNC: 37 UNITS/L
ANION GAP SERPL CALC-SCNC: 14 MMOL/L (ref 7–19)
AST SERPL-CCNC: 20 UNITS/L
BASOPHILS # BLD: 0 K/MCL (ref 0–0.3)
BASOPHILS NFR BLD: 0 %
BILIRUB SERPL-MCNC: 0.3 MG/DL (ref 0.2–1)
BUN SERPL-MCNC: 18 MG/DL (ref 6–20)
BUN/CREAT SERPL: 21 (ref 7–25)
CALCIUM SERPL-MCNC: 9.5 MG/DL (ref 8.4–10.2)
CHLORIDE SERPL-SCNC: 100 MMOL/L (ref 97–110)
CO2 SERPL-SCNC: 26 MMOL/L (ref 21–32)
CREAT SERPL-MCNC: 0.87 MG/DL (ref 0.51–0.95)
DEPRECATED RDW RBC: 41.1 FL (ref 39–50)
EOSINOPHIL # BLD: 0 K/MCL (ref 0–0.5)
EOSINOPHIL NFR BLD: 0 %
ERYTHROCYTE [DISTWIDTH] IN BLOOD: 11.9 % (ref 11–15)
FASTING DURATION TIME PATIENT: ABNORMAL H
GFR SERPLBLD BASED ON 1.73 SQ M-ARVRAT: 70 ML/MIN
GLOBULIN SER-MCNC: 4.5 G/DL (ref 2–4)
GLUCOSE SERPL-MCNC: 116 MG/DL (ref 70–99)
HCT VFR BLD CALC: 34.6 % (ref 36–46.5)
HGB BLD-MCNC: 11.5 G/DL (ref 12–15.5)
IMM GRANULOCYTES # BLD AUTO: 0 K/MCL (ref 0–0.2)
IMM GRANULOCYTES # BLD: 0 %
LYMPHOCYTES # BLD: 1.6 K/MCL (ref 1–4)
LYMPHOCYTES NFR BLD: 15 %
MAGNESIUM SERPL-MCNC: 2.3 MG/DL (ref 1.7–2.4)
MCH RBC QN AUTO: 31.9 PG (ref 26–34)
MCHC RBC AUTO-ENTMCNC: 33.2 G/DL (ref 32–36.5)
MCV RBC AUTO: 96.1 FL (ref 78–100)
MONOCYTES # BLD: 0.8 K/MCL (ref 0.3–0.9)
MONOCYTES NFR BLD: 7 %
NEUTROPHILS # BLD: 8.5 K/MCL (ref 1.8–7.7)
NEUTROPHILS NFR BLD: 78 %
NRBC BLD MANUAL-RTO: 0 /100 WBC
PLATELET # BLD AUTO: 572 K/MCL (ref 140–450)
POTASSIUM SERPL-SCNC: 4 MMOL/L (ref 3.4–5.1)
PROT SERPL-MCNC: 8 G/DL (ref 6.4–8.2)
RBC # BLD: 3.6 MIL/MCL (ref 4–5.2)
SODIUM SERPL-SCNC: 136 MMOL/L (ref 135–145)
TROPONIN I SERPL DL<=0.01 NG/ML-MCNC: 10 NG/L
WBC # BLD: 11 K/MCL (ref 4.2–11)

## 2023-06-18 PROCEDURE — 10002800 HB RX 250 W HCPCS: Performed by: EMERGENCY MEDICINE

## 2023-06-18 PROCEDURE — 10002807 HB RX 258: Performed by: EMERGENCY MEDICINE

## 2023-06-18 PROCEDURE — 99284 EMERGENCY DEPT VISIT MOD MDM: CPT

## 2023-06-18 PROCEDURE — 10002803 HB RX 637: Performed by: EMERGENCY MEDICINE

## 2023-06-18 PROCEDURE — 74022 RADEX COMPL AQT ABD SERIES: CPT

## 2023-06-18 PROCEDURE — 84484 ASSAY OF TROPONIN QUANT: CPT | Performed by: EMERGENCY MEDICINE

## 2023-06-18 PROCEDURE — 96361 HYDRATE IV INFUSION ADD-ON: CPT

## 2023-06-18 PROCEDURE — 96374 THER/PROPH/DIAG INJ IV PUSH: CPT

## 2023-06-18 PROCEDURE — 83735 ASSAY OF MAGNESIUM: CPT | Performed by: EMERGENCY MEDICINE

## 2023-06-18 PROCEDURE — 85025 COMPLETE CBC W/AUTO DIFF WBC: CPT | Performed by: EMERGENCY MEDICINE

## 2023-06-18 PROCEDURE — 80053 COMPREHEN METABOLIC PANEL: CPT | Performed by: EMERGENCY MEDICINE

## 2023-06-18 PROCEDURE — 93005 ELECTROCARDIOGRAM TRACING: CPT | Performed by: EMERGENCY MEDICINE

## 2023-06-18 RX ORDER — BISACODYL 10 MG
10 SUPPOSITORY, RECTAL RECTAL ONCE
Status: COMPLETED | OUTPATIENT
Start: 2023-06-18 | End: 2023-06-18

## 2023-06-18 RX ORDER — ONDANSETRON 2 MG/ML
4 INJECTION INTRAMUSCULAR; INTRAVENOUS ONCE
Status: COMPLETED | OUTPATIENT
Start: 2023-06-18 | End: 2023-06-18

## 2023-06-18 RX ADMIN — ONDANSETRON 4 MG: 2 INJECTION INTRAMUSCULAR; INTRAVENOUS at 23:30

## 2023-06-18 RX ADMIN — SODIUM CHLORIDE, POTASSIUM CHLORIDE, SODIUM LACTATE AND CALCIUM CHLORIDE 1000 ML: 600; 310; 30; 20 INJECTION, SOLUTION INTRAVENOUS at 23:32

## 2023-06-18 RX ADMIN — BISACODYL 10 MG: 10 SUPPOSITORY RECTAL at 22:34

## 2023-06-19 ENCOUNTER — CASE MANAGEMENT (OUTPATIENT)
Dept: CARE COORDINATION | Age: 73
End: 2023-06-19

## 2023-06-19 VITALS
WEIGHT: 145 LBS | HEIGHT: 63 IN | BODY MASS INDEX: 25.69 KG/M2 | DIASTOLIC BLOOD PRESSURE: 109 MMHG | HEART RATE: 91 BPM | RESPIRATION RATE: 14 BRPM | SYSTOLIC BLOOD PRESSURE: 199 MMHG | TEMPERATURE: 98.2 F | OXYGEN SATURATION: 97 %

## 2023-06-19 LAB
ATRIAL RATE (BPM): 116
P AXIS (DEGREES): 59
PR-INTERVAL (MSEC): 152
QRS-INTERVAL (MSEC): 84
QT-INTERVAL (MSEC): 332
QTC: 461
R AXIS (DEGREES): -35
REPORT TEXT: NORMAL
T AXIS (DEGREES): 76
VENTRICULAR RATE EKG/MIN (BPM): 116

## 2023-06-19 PROCEDURE — 10002803 HB RX 637: Performed by: EMERGENCY MEDICINE

## 2023-06-19 RX ORDER — POLYETHYLENE GLYCOL 3350 17 G/17G
17 POWDER, FOR SOLUTION ORAL DAILY
Qty: 116 G | Refills: 0 | Status: SHIPPED | OUTPATIENT
Start: 2023-06-19 | End: 2023-06-26

## 2023-06-19 RX ORDER — CALCIUM POLYCARBOPHIL 625 MG 625 MG/1
625 TABLET ORAL 4 TIMES DAILY
Qty: 28 TABLET | Refills: 0 | Status: SHIPPED | OUTPATIENT
Start: 2023-06-19 | End: 2023-06-26

## 2023-06-19 RX ORDER — LISINOPRIL 10 MG/1
20 TABLET ORAL ONCE
Status: COMPLETED | OUTPATIENT
Start: 2023-06-19 | End: 2023-06-19

## 2023-06-19 RX ORDER — LORAZEPAM 1 MG/1
1 TABLET ORAL
Status: COMPLETED | OUTPATIENT
Start: 2023-06-19 | End: 2023-06-19

## 2023-06-19 RX ADMIN — LISINOPRIL 20 MG: 10 TABLET ORAL at 02:28

## 2023-06-19 RX ADMIN — LORAZEPAM 1 MG: 1 TABLET ORAL at 02:28

## 2023-06-21 ENCOUNTER — CASE MANAGEMENT (OUTPATIENT)
Dept: CARE COORDINATION | Age: 73
End: 2023-06-21

## 2023-06-21 ENCOUNTER — TELEPHONE (OUTPATIENT)
Dept: FAMILY MEDICINE | Age: 73
End: 2023-06-21

## 2023-07-11 ENCOUNTER — EXTERNAL RECORD (OUTPATIENT)
Dept: HEALTH INFORMATION MANAGEMENT | Facility: OTHER | Age: 73
End: 2023-07-11

## 2023-07-11 ENCOUNTER — TELEPHONE (OUTPATIENT)
Dept: FAMILY MEDICINE | Age: 73
End: 2023-07-11

## 2023-07-11 DIAGNOSIS — F33.0 MILD RECURRENT MAJOR DEPRESSION (CMD): ICD-10-CM

## 2023-07-11 DIAGNOSIS — I10 ESSENTIAL (PRIMARY) HYPERTENSION: ICD-10-CM

## 2023-07-11 RX ORDER — FLUOXETINE 20 MG/1
30 TABLET, FILM COATED ORAL DAILY
Qty: 135 TABLET | Refills: 1 | Status: SHIPPED | OUTPATIENT
Start: 2023-07-11

## 2023-07-11 RX ORDER — LISINOPRIL 20 MG/1
20 TABLET ORAL DAILY
Qty: 90 TABLET | Refills: 1 | Status: SHIPPED | OUTPATIENT
Start: 2023-07-11

## 2023-09-12 ENCOUNTER — EXTERNAL RECORD (OUTPATIENT)
Dept: HEALTH INFORMATION MANAGEMENT | Facility: OTHER | Age: 73
End: 2023-09-12

## 2023-10-19 ENCOUNTER — EXTERNAL RECORD (OUTPATIENT)
Dept: HEALTH INFORMATION MANAGEMENT | Facility: OTHER | Age: 73
End: 2023-10-19

## 2023-11-29 ENCOUNTER — TELEPHONE (OUTPATIENT)
Dept: FAMILY MEDICINE | Age: 73
End: 2023-11-29

## 2023-11-29 DIAGNOSIS — I10 ESSENTIAL (PRIMARY) HYPERTENSION: ICD-10-CM

## 2023-11-29 RX ORDER — LISINOPRIL 20 MG/1
20 TABLET ORAL DAILY
Qty: 90 TABLET | Refills: 1 | Status: SHIPPED | OUTPATIENT
Start: 2023-11-29

## 2024-02-05 DIAGNOSIS — F33.0 MILD RECURRENT MAJOR DEPRESSION (CMD): ICD-10-CM

## 2024-02-05 RX ORDER — FLUOXETINE 20 MG/1
30 TABLET, FILM COATED ORAL DAILY
Qty: 135 TABLET | Refills: 1 | Status: SHIPPED | OUTPATIENT
Start: 2024-02-05

## 2024-05-15 ENCOUNTER — WALK IN (OUTPATIENT)
Dept: URGENT CARE | Age: 74
End: 2024-05-15
Attending: FAMILY MEDICINE

## 2024-05-15 VITALS
HEIGHT: 64 IN | WEIGHT: 145 LBS | HEART RATE: 92 BPM | TEMPERATURE: 98.6 F | DIASTOLIC BLOOD PRESSURE: 91 MMHG | BODY MASS INDEX: 24.75 KG/M2 | RESPIRATION RATE: 16 BRPM | OXYGEN SATURATION: 98 % | SYSTOLIC BLOOD PRESSURE: 150 MMHG

## 2024-05-15 DIAGNOSIS — J02.0 STREP PHARYNGITIS: Primary | ICD-10-CM

## 2024-05-15 LAB
INTERNAL PROCEDURAL CONTROLS ACCEPTABLE: YES
INTERNAL PROCEDURAL CONTROLS ACCEPTABLE: YES
S PYO AG THROAT QL IA.RAPID: POSITIVE
SARS-COV+SARS-COV-2 AG RESP QL IA.RAPID: NOT DETECTED
TEST LOT EXPIRATION DATE: ABNORMAL
TEST LOT EXPIRATION DATE: NORMAL
TEST LOT NUMBER: ABNORMAL
TEST LOT NUMBER: NORMAL

## 2024-05-15 PROCEDURE — 87880 STREP A ASSAY W/OPTIC: CPT

## 2024-05-15 PROCEDURE — 87426 SARSCOV CORONAVIRUS AG IA: CPT

## 2024-05-15 RX ORDER — AMOXICILLIN 500 MG/1
500 CAPSULE ORAL 2 TIMES DAILY
Qty: 20 CAPSULE | Refills: 0 | Status: SHIPPED | OUTPATIENT
Start: 2024-05-15 | End: 2024-05-25

## 2024-05-15 ASSESSMENT — PAIN SCALES - GENERAL: PAINLEVEL: 4

## 2024-06-17 ENCOUNTER — APPOINTMENT (OUTPATIENT)
Dept: RURAL CLINIC 9 | Age: 74
Setting detail: DERMATOLOGY
End: 2024-06-17

## 2024-06-17 DIAGNOSIS — Z71.89 OTHER SPECIFIED COUNSELING: ICD-10-CM

## 2024-06-17 DIAGNOSIS — L73.8 OTHER SPECIFIED FOLLICULAR DISORDERS: ICD-10-CM

## 2024-06-17 DIAGNOSIS — L82.1 OTHER SEBORRHEIC KERATOSIS: ICD-10-CM

## 2024-06-17 DIAGNOSIS — D18.0 HEMANGIOMA: ICD-10-CM

## 2024-06-17 DIAGNOSIS — L82.0 INFLAMED SEBORRHEIC KERATOSIS: ICD-10-CM

## 2024-06-17 DIAGNOSIS — L60.4 BEAU'S LINES: ICD-10-CM

## 2024-06-17 PROBLEM — D18.01 HEMANGIOMA OF SKIN AND SUBCUTANEOUS TISSUE: Status: ACTIVE | Noted: 2024-06-17

## 2024-06-17 PROCEDURE — OTHER LIQUID NITROGEN: OTHER

## 2024-06-17 PROCEDURE — 99203 OFFICE O/P NEW LOW 30 MIN: CPT | Mod: 25

## 2024-06-17 PROCEDURE — OTHER MIPS QUALITY: OTHER

## 2024-06-17 PROCEDURE — 17110 DESTRUCT B9 LESION 1-14: CPT

## 2024-06-17 PROCEDURE — OTHER COUNSELING: OTHER

## 2024-06-17 ASSESSMENT — LOCATION DETAILED DESCRIPTION DERM
LOCATION DETAILED: RIGHT SMALL FINGERNAIL
LOCATION DETAILED: RIGHT INFERIOR MEDIAL FOREHEAD
LOCATION DETAILED: RIGHT MEDIAL UPPER BACK
LOCATION DETAILED: LEFT MEDIAL SUPERIOR CHEST
LOCATION DETAILED: LEFT INFERIOR MEDIAL FOREHEAD
LOCATION DETAILED: LEFT INFERIOR ANTERIOR NECK
LOCATION DETAILED: LEFT MEDIAL UPPER BACK
LOCATION DETAILED: LEFT LATERAL SUPERIOR CHEST
LOCATION DETAILED: SUPERIOR THORACIC SPINE
LOCATION DETAILED: RIGHT INFERIOR UPPER BACK
LOCATION DETAILED: LEFT INFERIOR UPPER BACK

## 2024-06-17 ASSESSMENT — LOCATION SIMPLE DESCRIPTION DERM
LOCATION SIMPLE: LEFT ANTERIOR NECK
LOCATION SIMPLE: LEFT UPPER BACK
LOCATION SIMPLE: UPPER BACK
LOCATION SIMPLE: RIGHT SMALL FINGERNAIL
LOCATION SIMPLE: CHEST
LOCATION SIMPLE: LEFT FOREHEAD
LOCATION SIMPLE: RIGHT UPPER BACK
LOCATION SIMPLE: RIGHT FOREHEAD

## 2024-06-17 ASSESSMENT — LOCATION ZONE DERM
LOCATION ZONE: FACE
LOCATION ZONE: TRUNK
LOCATION ZONE: FINGERNAIL
LOCATION ZONE: NECK

## 2024-06-17 NOTE — PROCEDURE: LIQUID NITROGEN
Include Z78.9 (Other Specified Conditions Influencing Health Status) As An Associated Diagnosis?: No
Medical Necessity Clause: This procedure was medically necessary because the lesion that was treated was: irritated and enlarging; traumatized by CPAP strap and glasses
Post-Care Instructions: I reviewed with the patient in detail post-care instructions. Patient is to wear sunprotection, and avoid picking at any of the treated lesions. Pt may apply Vaseline to crusted or scabbing areas.
Show Spray Paint Technique Variable?: Yes
Detail Level: Detailed
Spray Paint Text: The liquid nitrogen was applied to the skin utilizing a spray paint frosting technique.
Consent: The patient's consent was obtained including but not limited to risks of crusting, scabbing, blistering, scarring, darker or lighter pigmentary change, recurrence, incomplete removal and infection.
Medical Necessity Information: It is in your best interest to select a reason for this procedure from the list below. All of these items fulfill various CMS LCD requirements except the new and changing color options.

## 2024-06-17 NOTE — HPI: OTHER
Condition:: Pinky nail
Please Describe Your Condition:: is a new patient who is being seen for a chief complaint of swelling, discoloration and irregular finger nail on her right pinky fingernail. At the time the black discoloration occurred she was taking care of her  who had dementia. He  in December. The nail turned black and the end if the same finger looked bruised. The black color grew out over several months.

## 2024-07-18 ENCOUNTER — EXTERNAL RECORD (OUTPATIENT)
Dept: HEALTH INFORMATION MANAGEMENT | Facility: OTHER | Age: 74
End: 2024-07-18

## 2024-07-18 ENCOUNTER — APPOINTMENT (OUTPATIENT)
Dept: FAMILY MEDICINE | Age: 74
End: 2024-07-18

## 2024-07-18 VITALS
TEMPERATURE: 97.4 F | WEIGHT: 156.31 LBS | HEART RATE: 62 BPM | DIASTOLIC BLOOD PRESSURE: 82 MMHG | SYSTOLIC BLOOD PRESSURE: 135 MMHG | BODY MASS INDEX: 26.69 KG/M2 | HEIGHT: 64 IN

## 2024-07-18 DIAGNOSIS — Z12.31 ENCOUNTER FOR SCREENING MAMMOGRAM FOR MALIGNANT NEOPLASM OF BREAST: ICD-10-CM

## 2024-07-18 DIAGNOSIS — F41.9 ANXIETY: ICD-10-CM

## 2024-07-18 DIAGNOSIS — I10 ESSENTIAL (PRIMARY) HYPERTENSION: ICD-10-CM

## 2024-07-18 DIAGNOSIS — Z00.00 MEDICARE ANNUAL WELLNESS VISIT, SUBSEQUENT: Primary | ICD-10-CM

## 2024-07-18 DIAGNOSIS — M51.36 DDD (DEGENERATIVE DISC DISEASE), LUMBAR: ICD-10-CM

## 2024-07-18 DIAGNOSIS — E78.2 MIXED HYPERLIPIDEMIA: ICD-10-CM

## 2024-07-18 DIAGNOSIS — F33.0 MILD RECURRENT MAJOR DEPRESSION (CMD): ICD-10-CM

## 2024-07-18 DIAGNOSIS — N95.2 VAGINAL ATROPHY: ICD-10-CM

## 2024-07-18 DIAGNOSIS — Z12.11 SCREEN FOR COLON CANCER: ICD-10-CM

## 2024-07-18 PROBLEM — M51.369 DDD (DEGENERATIVE DISC DISEASE), LUMBAR: Status: ACTIVE | Noted: 2024-07-18

## 2024-07-18 LAB
APPEARANCE, POC: CLEAR
BILIRUBIN, POC: NEGATIVE
COLOR, POC: YELLOW
GLUCOSE UR-MCNC: NEGATIVE MG/DL
KETONES, POC: NEGATIVE MG/DL
NITRITE, POC: NEGATIVE
OCCULT BLOOD, POC: NEGATIVE
PH UR: 5 [PH] (ref 5–7)
PROT UR-MCNC: NEGATIVE MG/DL
SP GR UR: 1.01 (ref 1–1.03)
UROBILINOGEN UR-MCNC: 0.2 MG/DL (ref 0–1)
WBC (LEUKOCYTE) ESTERASE, POC: NEGATIVE

## 2024-07-18 RX ORDER — FLUOXETINE 20 MG/1
20 TABLET, FILM COATED ORAL DAILY
Qty: 90 TABLET | Refills: 3 | Status: SHIPPED | OUTPATIENT
Start: 2024-07-18

## 2024-07-18 RX ORDER — CLONAZEPAM 1 MG/1
1 TABLET ORAL 2 TIMES DAILY PRN
Qty: 60 TABLET | Refills: 0 | Status: SHIPPED | OUTPATIENT
Start: 2024-07-18

## 2024-07-18 RX ORDER — GABAPENTIN 300 MG/1
300 CAPSULE ORAL 2 TIMES DAILY
Qty: 180 CAPSULE | Refills: 3 | Status: SHIPPED | OUTPATIENT
Start: 2024-07-18

## 2024-07-18 RX ORDER — LISINOPRIL 20 MG/1
20 TABLET ORAL DAILY
Qty: 90 TABLET | Refills: 3 | Status: SHIPPED | OUTPATIENT
Start: 2024-07-18

## 2024-07-18 RX ORDER — ESTRADIOL 0.1 MG/G
1 CREAM VAGINAL
Qty: 8 G | Refills: 2 | Status: SHIPPED | OUTPATIENT
Start: 2024-07-18 | End: 2024-10-16

## 2024-07-18 RX ORDER — IBUPROFEN 200 MG
600 TABLET ORAL EVERY 6 HOURS PRN
COMMUNITY

## 2024-07-18 SDOH — ECONOMIC STABILITY: HOUSING INSECURITY: DO YOU HAVE PROBLEMS WITH ANY OF THE FOLLOWING?: NONE OF THE ABOVE

## 2024-07-18 SDOH — ECONOMIC STABILITY: FOOD INSECURITY: WITHIN THE PAST 12 MONTHS, THE FOOD YOU BOUGHT JUST DIDN'T LAST AND YOU DIDN'T HAVE MONEY TO GET MORE.: PATIENT DECLINED

## 2024-07-18 SDOH — ECONOMIC STABILITY: TRANSPORTATION INSECURITY
IN THE PAST 12 MONTHS, HAS LACK OF RELIABLE TRANSPORTATION KEPT YOU FROM MEDICAL APPOINTMENTS, MEETINGS, WORK OR FROM GETTING THINGS NEEDED FOR DAILY LIVING?: NO

## 2024-07-18 SDOH — HEALTH STABILITY: PHYSICAL HEALTH: ON AVERAGE, HOW MANY DAYS PER WEEK DO YOU ENGAGE IN MODERATE TO STRENUOUS EXERCISE (LIKE A BRISK WALK)?: 2 DAYS

## 2024-07-18 SDOH — HEALTH STABILITY: PHYSICAL HEALTH: ON AVERAGE, HOW MANY MINUTES DO YOU ENGAGE IN EXERCISE AT THIS LEVEL?: 30 MIN

## 2024-07-18 SDOH — ECONOMIC STABILITY: HOUSING INSECURITY: WHAT IS YOUR LIVING SITUATION TODAY?: I HAVE A PLACE TO LIVE TODAY, BUT I AM WORRIED ABOUT LOSING IT IN THE FUTURE

## 2024-07-18 SDOH — SOCIAL STABILITY: SOCIAL NETWORK
HOW OFTEN DO YOU SEE OR TALK TO PEOPLE THAT YOU CARE ABOUT AND FEEL CLOSE TO? (FOR EXAMPLE: TALKING TO FRIENDS ON THE PHONE, VISITING FRIENDS OR FAMILY, GOING TO CHURCH OR CLUB MEETINGS): 1 OR 2 TIMES A WEEK

## 2024-07-18 ASSESSMENT — ACTIVITIES OF DAILY LIVING (ADL)
RECENT_DECLINE_ADL: NO
ADL_SCORE: 12
NEEDS_ASSIST: NO
ADL_BEFORE_ADMISSION: INDEPENDENT
ADL_SHORT_OF_BREATH: NO

## 2024-07-18 ASSESSMENT — ENCOUNTER SYMPTOMS
COUGH: 0
NAUSEA: 0
ADENOPATHY: 0
BLOOD IN STOOL: 0
BRUISES/BLEEDS EASILY: 0
CONSTIPATION: 0
APPETITE CHANGE: 0
NUMBNESS: 0
CHILLS: 0
EYE PAIN: 0
HEADACHES: 0
SHORTNESS OF BREATH: 0
DIZZINESS: 0
COLOR CHANGE: 0
UNEXPECTED WEIGHT CHANGE: 0
WEAKNESS: 0
TREMORS: 0
EYE REDNESS: 0
DIARRHEA: 0
BACK PAIN: 1
POLYDIPSIA: 0
FEVER: 0
WHEEZING: 0
POLYPHAGIA: 0
EYE DISCHARGE: 0
ABDOMINAL PAIN: 0
NERVOUS/ANXIOUS: 1
ABDOMINAL DISTENTION: 0
VOMITING: 0
FATIGUE: 0
SINUS PAIN: 0
SORE THROAT: 0

## 2024-07-18 ASSESSMENT — MINI COG
PATIENT WAS GIVEN REPEAT BACK WORDS FROM VERSION: 3 - VILLAGE KITCHEN BABY
TOTAL SCORE: 5
PATIENT ABLE TO REPEAT THE 3 WORDS GIVEN PREVIOUSLY?: WAS ABLE TO REPEAT BACK 3 WORDS CORRECTLY
PATIENT ABLE TO FILL IN THE CLOCK FACE WITH 10 MINUTES PAST 11 O'CLOCK?: YES, CLOCK IS CORRECT

## 2024-07-18 ASSESSMENT — LIFESTYLE VARIABLES
HOW OFTEN DO YOU HAVE A DRINK CONTAINING ALCOHOL: 4 OR MORE TIMES PER WEEK
AUDIT-C TOTAL SCORE: 4
HOW MANY STANDARD DRINKS CONTAINING ALCOHOL DO YOU HAVE ON A TYPICAL DAY: 0,1 OR 2

## 2024-07-18 ASSESSMENT — PATIENT HEALTH QUESTIONNAIRE - PHQ9
CLINICAL INTERPRETATION OF PHQ2 SCORE: NO FURTHER SCREENING NEEDED
1. LITTLE INTEREST OR PLEASURE IN DOING THINGS: NOT AT ALL
SUM OF ALL RESPONSES TO PHQ9 QUESTIONS 1 AND 2: 0
CLINICAL INTERPRETATION OF PHQ2 SCORE: 0
2. FEELING DOWN, DEPRESSED OR HOPELESS: NOT AT ALL

## 2024-07-18 ASSESSMENT — PAIN SCALES - GENERAL: PAINLEVEL: 4

## 2024-07-18 ASSESSMENT — SOCIAL DETERMINANTS OF HEALTH (SDOH): IN THE PAST 12 MONTHS, HAS THE ELECTRIC, GAS, OIL, OR WATER COMPANY THREATENED TO SHUT OFF SERVICE IN YOUR HOME?: NO

## 2024-07-19 ENCOUNTER — TELEPHONE (OUTPATIENT)
Dept: FAMILY MEDICINE | Age: 74
End: 2024-07-19

## 2024-07-19 LAB
ALBUMIN SERPL-MCNC: 3.5 G/DL (ref 3.6–5.1)
ALBUMIN/GLOB SERPL: 1 {RATIO} (ref 1–2.4)
ALP SERPL-CCNC: 79 UNITS/L (ref 45–117)
ALT SERPL-CCNC: 22 UNITS/L
ANION GAP SERPL CALC-SCNC: 8 MMOL/L (ref 7–19)
AST SERPL-CCNC: 16 UNITS/L
BILIRUB SERPL-MCNC: 0.6 MG/DL (ref 0.2–1)
BUN SERPL-MCNC: 23 MG/DL (ref 6–20)
BUN/CREAT SERPL: 30 (ref 7–25)
CALCIUM SERPL-MCNC: 9.3 MG/DL (ref 8.4–10.2)
CHLORIDE SERPL-SCNC: 108 MMOL/L (ref 97–110)
CHOLEST SERPL-MCNC: 206 MG/DL
CHOLEST/HDLC SERPL: 2.6 {RATIO}
CO2 SERPL-SCNC: 28 MMOL/L (ref 21–32)
CREAT SERPL-MCNC: 0.77 MG/DL (ref 0.51–0.95)
EGFRCR SERPLBLD CKD-EPI 2021: 81 ML/MIN/{1.73_M2}
FASTING DURATION TIME PATIENT: ABNORMAL H
GLOBULIN SER-MCNC: 3.6 G/DL (ref 2–4)
GLUCOSE SERPL-MCNC: 92 MG/DL (ref 70–99)
HDLC SERPL-MCNC: 80 MG/DL
LDLC SERPL CALC-MCNC: 115 MG/DL
NONHDLC SERPL-MCNC: 126 MG/DL
POTASSIUM SERPL-SCNC: 4.2 MMOL/L (ref 3.4–5.1)
PROT SERPL-MCNC: 7.1 G/DL (ref 6.4–8.2)
SODIUM SERPL-SCNC: 140 MMOL/L (ref 135–145)
TRIGL SERPL-MCNC: 56 MG/DL

## 2025-01-17 ENCOUNTER — TELEPHONE (OUTPATIENT)
Age: 75
End: 2025-01-17

## 2025-01-21 PROBLEM — R82.90 ABNORMAL URINE: Status: RESOLVED | Noted: 2022-01-06 | Resolved: 2025-01-21

## 2025-01-21 PROBLEM — Z23 NEED FOR VACCINATION AGAINST STREPTOCOCCUS PNEUMONIAE: Status: RESOLVED | Noted: 2022-01-06 | Resolved: 2025-01-21

## 2025-01-21 PROBLEM — S83.31XA: Status: RESOLVED | Noted: 2022-07-28 | Resolved: 2025-01-21

## 2025-01-21 PROBLEM — U07.1 COVID-19 VIRUS INFECTION: Status: RESOLVED | Noted: 2022-08-25 | Resolved: 2025-01-21

## 2025-01-21 PROBLEM — Z01.818 PRE-OP EXAMINATION: Status: RESOLVED | Noted: 2023-05-25 | Resolved: 2025-01-21

## 2025-01-23 ENCOUNTER — OFFICE VISIT (OUTPATIENT)
Age: 75
End: 2025-01-23

## 2025-01-23 VITALS
BODY MASS INDEX: 25.11 KG/M2 | SYSTOLIC BLOOD PRESSURE: 134 MMHG | HEART RATE: 86 BPM | TEMPERATURE: 98 F | DIASTOLIC BLOOD PRESSURE: 82 MMHG | WEIGHT: 144 LBS

## 2025-01-23 DIAGNOSIS — M51.369 DEGENERATION OF INTERVERTEBRAL DISC OF LUMBAR REGION, UNSPECIFIED WHETHER PAIN PRESENT: Primary | ICD-10-CM

## 2025-01-23 DIAGNOSIS — I10 ESSENTIAL (PRIMARY) HYPERTENSION: ICD-10-CM

## 2025-01-23 DIAGNOSIS — F33.0 MILD RECURRENT MAJOR DEPRESSION (CMD): ICD-10-CM

## 2025-01-23 RX ORDER — FLUOXETINE 20 MG/1
20 TABLET, FILM COATED ORAL DAILY
Qty: 90 TABLET | Refills: 1 | Status: SHIPPED | OUTPATIENT
Start: 2025-01-23

## 2025-01-23 RX ORDER — IBUPROFEN 600 MG/1
600 TABLET, FILM COATED ORAL EVERY 6 HOURS PRN
Qty: 90 TABLET | Refills: 1 | Status: SHIPPED | OUTPATIENT
Start: 2025-01-23

## 2025-01-23 RX ORDER — LISINOPRIL 20 MG/1
20 TABLET ORAL DAILY
Qty: 90 TABLET | Refills: 1 | Status: SHIPPED | OUTPATIENT
Start: 2025-01-23

## 2025-01-23 ASSESSMENT — PATIENT HEALTH QUESTIONNAIRE - PHQ9
1. LITTLE INTEREST OR PLEASURE IN DOING THINGS: NOT AT ALL
2. FEELING DOWN, DEPRESSED OR HOPELESS: NOT AT ALL
SUM OF ALL RESPONSES TO PHQ9 QUESTIONS 1 AND 2: 0
CLINICAL INTERPRETATION OF PHQ2 SCORE: NO FURTHER SCREENING NEEDED
SUM OF ALL RESPONSES TO PHQ9 QUESTIONS 1 AND 2: 0

## 2025-01-23 ASSESSMENT — ENCOUNTER SYMPTOMS
UNEXPECTED WEIGHT CHANGE: 0
FEVER: 0
NUMBNESS: 0
BACK PAIN: 1

## 2025-04-17 DIAGNOSIS — Z98.1 ARTHRODESIS STATUS: Primary | ICD-10-CM

## 2025-04-17 DIAGNOSIS — M51.369 DEGENERATION OF LUMBAR INTERVERTEBRAL DISC: ICD-10-CM

## 2025-04-17 DIAGNOSIS — M81.0 OSTEOPOROSIS: ICD-10-CM

## 2025-04-17 DIAGNOSIS — M47.817 LUMBOSACRAL SPONDYLOSIS WITHOUT MYELOPATHY: ICD-10-CM

## 2025-04-23 ENCOUNTER — HOSPITAL ENCOUNTER (OUTPATIENT)
Dept: CT IMAGING | Age: 75
Discharge: HOME | End: 2025-04-23
Attending: PHYSICIAN ASSISTANT

## 2025-04-23 DIAGNOSIS — Z98.1 ARTHRODESIS STATUS: ICD-10-CM

## 2025-04-23 DIAGNOSIS — M81.0 OSTEOPOROSIS: ICD-10-CM

## 2025-04-23 DIAGNOSIS — M47.817 LUMBOSACRAL SPONDYLOSIS WITHOUT MYELOPATHY: ICD-10-CM

## 2025-04-23 DIAGNOSIS — M51.369 DEGENERATION OF LUMBAR INTERVERTEBRAL DISC: ICD-10-CM

## 2025-04-23 PROCEDURE — 72131 CT LUMBAR SPINE W/O DYE: CPT

## 2025-06-13 DIAGNOSIS — M54.50 LOW BACK PAIN: Primary | ICD-10-CM

## 2025-06-19 ENCOUNTER — HOSPITAL ENCOUNTER (OUTPATIENT)
Dept: CT IMAGING | Age: 75
Discharge: HOME | End: 2025-06-19
Attending: FAMILY MEDICINE

## 2025-06-19 DIAGNOSIS — M54.50 LOW BACK PAIN: ICD-10-CM

## 2025-06-19 PROCEDURE — 72131 CT LUMBAR SPINE W/O DYE: CPT

## 2025-06-30 ENCOUNTER — APPOINTMENT (OUTPATIENT)
Dept: RURAL CLINIC 9 | Age: 75
Setting detail: DERMATOLOGY
End: 2025-06-30

## 2025-06-30 DIAGNOSIS — D22 MELANOCYTIC NEVI: ICD-10-CM

## 2025-06-30 DIAGNOSIS — L73.8 OTHER SPECIFIED FOLLICULAR DISORDERS: ICD-10-CM

## 2025-06-30 DIAGNOSIS — L82.0 INFLAMED SEBORRHEIC KERATOSIS: ICD-10-CM

## 2025-06-30 DIAGNOSIS — L82.1 OTHER SEBORRHEIC KERATOSIS: ICD-10-CM

## 2025-06-30 DIAGNOSIS — D18.0 HEMANGIOMA: ICD-10-CM

## 2025-06-30 DIAGNOSIS — L66.89 OTHER CICATRICIAL ALOPECIA: ICD-10-CM

## 2025-06-30 DIAGNOSIS — Z71.89 OTHER SPECIFIED COUNSELING: ICD-10-CM

## 2025-06-30 PROBLEM — D22.62 MELANOCYTIC NEVI OF LEFT UPPER LIMB, INCLUDING SHOULDER: Status: ACTIVE | Noted: 2025-06-30

## 2025-06-30 PROBLEM — D18.01 HEMANGIOMA OF SKIN AND SUBCUTANEOUS TISSUE: Status: ACTIVE | Noted: 2025-06-30

## 2025-06-30 PROCEDURE — 17110 DESTRUCT B9 LESION 1-14: CPT

## 2025-06-30 PROCEDURE — OTHER PRESCRIPTION: OTHER

## 2025-06-30 PROCEDURE — OTHER MIPS QUALITY: OTHER

## 2025-06-30 PROCEDURE — OTHER LIQUID NITROGEN: OTHER

## 2025-06-30 PROCEDURE — OTHER COUNSELING: OTHER

## 2025-06-30 PROCEDURE — 99213 OFFICE O/P EST LOW 20 MIN: CPT | Mod: 25

## 2025-06-30 PROCEDURE — OTHER PRESCRIPTION MEDICATION MANAGEMENT: OTHER

## 2025-06-30 RX ORDER — CLOBETASOL PROPIONATE 0.5 MG/ML
SOLUTION TOPICAL
Qty: 50 | Refills: 3 | Status: ERX | COMMUNITY
Start: 2025-06-30

## 2025-06-30 ASSESSMENT — LOCATION SIMPLE DESCRIPTION DERM
LOCATION SIMPLE: LEFT SHOULDER
LOCATION SIMPLE: ABDOMEN
LOCATION SIMPLE: CHEST
LOCATION SIMPLE: RIGHT CHEEK
LOCATION SIMPLE: NECK
LOCATION SIMPLE: ANTERIOR SCALP
LOCATION SIMPLE: RIGHT LOWER BACK
LOCATION SIMPLE: RIGHT UPPER BACK

## 2025-06-30 ASSESSMENT — LOCATION DETAILED DESCRIPTION DERM
LOCATION DETAILED: RIGHT MEDIAL MALAR CHEEK
LOCATION DETAILED: MID-FRONTAL SCALP
LOCATION DETAILED: RIGHT SUPERIOR MEDIAL UPPER BACK
LOCATION DETAILED: LEFT ANTERIOR SHOULDER
LOCATION DETAILED: LEFT MEDIAL SUPERIOR CHEST
LOCATION DETAILED: LEFT CENTRAL LATERAL NECK
LOCATION DETAILED: RIGHT MID-UPPER BACK
LOCATION DETAILED: RIGHT INFERIOR MEDIAL MALAR CHEEK
LOCATION DETAILED: RIGHT SUPERIOR LATERAL MIDBACK
LOCATION DETAILED: EPIGASTRIC SKIN

## 2025-06-30 ASSESSMENT — LOCATION ZONE DERM
LOCATION ZONE: FACE
LOCATION ZONE: TRUNK
LOCATION ZONE: SCALP
LOCATION ZONE: ARM
LOCATION ZONE: NECK

## 2025-07-24 ENCOUNTER — APPOINTMENT (OUTPATIENT)
Dept: FAMILY MEDICINE | Age: 75
End: 2025-07-24

## 2025-07-24 VITALS
SYSTOLIC BLOOD PRESSURE: 138 MMHG | HEART RATE: 90 BPM | TEMPERATURE: 98 F | WEIGHT: 154.43 LBS | BODY MASS INDEX: 26.37 KG/M2 | HEIGHT: 64 IN | DIASTOLIC BLOOD PRESSURE: 88 MMHG

## 2025-07-24 DIAGNOSIS — I10 ESSENTIAL (PRIMARY) HYPERTENSION: ICD-10-CM

## 2025-07-24 DIAGNOSIS — F33.0 MILD RECURRENT MAJOR DEPRESSION (CMD): ICD-10-CM

## 2025-07-24 DIAGNOSIS — R73.09 ELEVATED GLUCOSE: ICD-10-CM

## 2025-07-24 DIAGNOSIS — Z12.31 ENCOUNTER FOR SCREENING MAMMOGRAM FOR MALIGNANT NEOPLASM OF BREAST: ICD-10-CM

## 2025-07-24 DIAGNOSIS — E78.2 MIXED HYPERLIPIDEMIA: ICD-10-CM

## 2025-07-24 DIAGNOSIS — N95.9 MENOPAUSAL DISORDER: ICD-10-CM

## 2025-07-24 DIAGNOSIS — Z12.11 SCREEN FOR COLON CANCER: ICD-10-CM

## 2025-07-24 DIAGNOSIS — D64.9 ANEMIA, UNSPECIFIED TYPE: ICD-10-CM

## 2025-07-24 DIAGNOSIS — Z00.00 MEDICARE ANNUAL WELLNESS VISIT, SUBSEQUENT: Primary | ICD-10-CM

## 2025-07-24 LAB
APPEARANCE, POC: CLEAR
BILIRUB UR QL STRIP: NEGATIVE
COLOR UR: YELLOW
GLUCOSE UR-MCNC: NEGATIVE MG/DL
HGB UR QL STRIP: NEGATIVE
KETONES UR STRIP-MCNC: NEGATIVE MG/DL
LEUKOCYTE ESTERASE UR QL STRIP: ABNORMAL
NITRITE UR QL STRIP: NEGATIVE
PH UR: 5.5 [PH] (ref 5–7)
PROT UR-MCNC: NEGATIVE MG/DL
SP GR UR: 1.02 (ref 1–1.03)
UROBILINOGEN UR-MCNC: 0.2 MG/DL (ref 0–1)

## 2025-07-24 RX ORDER — LISINOPRIL 20 MG/1
20 TABLET ORAL DAILY
Qty: 90 TABLET | Refills: 3 | Status: SHIPPED | OUTPATIENT
Start: 2025-07-24

## 2025-07-24 RX ORDER — FLUOXETINE 20 MG/1
20 TABLET, FILM COATED ORAL DAILY
Qty: 90 TABLET | Refills: 3 | Status: SHIPPED | OUTPATIENT
Start: 2025-07-24

## 2025-07-24 SDOH — ECONOMIC STABILITY: HOUSING INSECURITY: WHAT IS YOUR LIVING SITUATION TODAY?: I HAVE A STEADY PLACE TO LIVE

## 2025-07-24 SDOH — ECONOMIC STABILITY: HOUSING INSECURITY: DO YOU HAVE PROBLEMS WITH ANY OF THE FOLLOWING?: NONE OF THE ABOVE

## 2025-07-24 SDOH — ECONOMIC STABILITY: FOOD INSECURITY: WITHIN THE PAST 12 MONTHS, THE FOOD YOU BOUGHT JUST DIDN'T LAST AND YOU DIDN'T HAVE MONEY TO GET MORE.: NEVER TRUE

## 2025-07-24 ASSESSMENT — ENCOUNTER SYMPTOMS
SLEEP DISTURBANCE: 0
DIZZINESS: 0
WEAKNESS: 0
EYE PAIN: 0
EYE REDNESS: 0
NERVOUS/ANXIOUS: 1
CHILLS: 0
CONSTIPATION: 0
VOMITING: 0
NUMBNESS: 0
BACK PAIN: 1
SINUS PAIN: 0
FEVER: 0
COLOR CHANGE: 0
HEADACHES: 0
ADENOPATHY: 0
ABDOMINAL DISTENTION: 0
UNEXPECTED WEIGHT CHANGE: 0
POLYPHAGIA: 0
BRUISES/BLEEDS EASILY: 0
SHORTNESS OF BREATH: 0
POLYDIPSIA: 0
SORE THROAT: 0
DIARRHEA: 0
ABDOMINAL PAIN: 0
FATIGUE: 0
WHEEZING: 0
NAUSEA: 0
APPETITE CHANGE: 0
TREMORS: 0
COUGH: 0
BLOOD IN STOOL: 0
EYE DISCHARGE: 0

## 2025-07-24 ASSESSMENT — PATIENT HEALTH QUESTIONNAIRE - PHQ9
CLINICAL INTERPRETATION OF PHQ2 SCORE: 1
CLINICAL INTERPRETATION OF PHQ2 SCORE: NO FURTHER SCREENING NEEDED
1. LITTLE INTEREST OR PLEASURE IN DOING THINGS: SEVERAL DAYS
SUM OF ALL RESPONSES TO PHQ9 QUESTIONS 1 AND 2: 1
2. FEELING DOWN, DEPRESSED OR HOPELESS: NOT AT ALL

## 2025-07-24 ASSESSMENT — MINI COG
PATIENT ABLE TO FILL IN THE CLOCK FACE WITH 10 MINUTES PAST 11 O'CLOCK?: YES, CLOCK IS CORRECT
PATIENT WAS GIVEN REPEAT BACK WORDS FROM VERSION: 3 - VILLAGE KITCHEN BABY
TOTAL SCORE: 5
PATIENT ABLE TO REPEAT THE 3 WORDS GIVEN PREVIOUSLY?: WAS ABLE TO REPEAT BACK 3 WORDS CORRECTLY

## 2025-07-24 ASSESSMENT — SOCIAL DETERMINANTS OF HEALTH (SDOH): IN THE PAST 12 MONTHS, HAS THE ELECTRIC, GAS, OIL, OR WATER COMPANY THREATENED TO SHUT OFF SERVICE IN YOUR HOME?: NO

## 2025-07-25 ENCOUNTER — LAB SERVICES (OUTPATIENT)
Dept: LAB | Age: 75
End: 2025-07-25

## 2025-07-25 DIAGNOSIS — I10 ESSENTIAL (PRIMARY) HYPERTENSION: ICD-10-CM

## 2025-07-25 DIAGNOSIS — R73.09 ELEVATED GLUCOSE: ICD-10-CM

## 2025-07-25 DIAGNOSIS — D64.9 ANEMIA, UNSPECIFIED TYPE: ICD-10-CM

## 2025-07-25 DIAGNOSIS — E78.2 MIXED HYPERLIPIDEMIA: ICD-10-CM

## 2025-07-25 LAB
ALBUMIN SERPL-MCNC: 3.4 G/DL (ref 3.4–5)
ALBUMIN/GLOB SERPL: 0.9 {RATIO} (ref 1–2.4)
ALP SERPL-CCNC: 97 UNITS/L (ref 45–117)
ALT SERPL-CCNC: 27 UNITS/L
ANION GAP SERPL CALC-SCNC: 10 MMOL/L (ref 7–19)
AST SERPL-CCNC: 18 UNITS/L
BASOPHILS # BLD: 0 K/MCL (ref 0–0.3)
BASOPHILS NFR BLD: 1 %
BILIRUB SERPL-MCNC: 1.1 MG/DL (ref 0.2–1)
BUN SERPL-MCNC: 20 MG/DL (ref 6–20)
BUN/CREAT SERPL: 22 (ref 7–25)
CALCIUM SERPL-MCNC: 9.8 MG/DL (ref 8.4–10.2)
CHLORIDE SERPL-SCNC: 105 MMOL/L (ref 97–110)
CHOLEST SERPL-MCNC: 255 MG/DL
CHOLEST/HDLC SERPL: 2.3 {RATIO}
CO2 SERPL-SCNC: 29 MMOL/L (ref 21–32)
CREAT SERPL-MCNC: 0.92 MG/DL (ref 0.51–0.95)
DEPRECATED RDW RBC: 44.1 FL (ref 39–50)
EGFRCR SERPLBLD CKD-EPI 2021: 65 ML/MIN/{1.73_M2}
EOSINOPHIL # BLD: 0 K/MCL (ref 0–0.5)
EOSINOPHIL NFR BLD: 1 %
ERYTHROCYTE [DISTWIDTH] IN BLOOD: 12 % (ref 11–15)
FASTING DURATION TIME PATIENT: 12 HOURS (ref 0–999)
GLOBULIN SER-MCNC: 3.6 G/DL (ref 2–4)
GLUCOSE SERPL-MCNC: 92 MG/DL (ref 70–99)
HBA1C MFR BLD: 5.3 % (ref 4.5–5.6)
HCT VFR BLD CALC: 40 % (ref 36–46.5)
HDLC SERPL-MCNC: 109 MG/DL
HGB BLD-MCNC: 14 G/DL (ref 12–15.5)
IMM GRANULOCYTES # BLD AUTO: 0 K/MCL (ref 0–0.2)
IMM GRANULOCYTES # BLD: 0 %
LDLC SERPL CALC-MCNC: 126 MG/DL
LYMPHOCYTES # BLD: 1.2 K/MCL (ref 1–4)
LYMPHOCYTES NFR BLD: 26 %
MCH RBC QN AUTO: 35 PG (ref 26–34)
MCHC RBC AUTO-ENTMCNC: 35 G/DL (ref 32–36.5)
MCV RBC AUTO: 100 FL (ref 78–100)
MONOCYTES # BLD: 0.5 K/MCL (ref 0.3–0.9)
MONOCYTES NFR BLD: 10 %
NEUTROPHILS # BLD: 3 K/MCL (ref 1.8–7.7)
NEUTROPHILS NFR BLD: 62 %
NONHDLC SERPL-MCNC: 146 MG/DL
NRBC BLD MANUAL-RTO: 0 /100 WBC
PLATELET # BLD AUTO: 285 K/MCL (ref 140–450)
POTASSIUM SERPL-SCNC: 4.5 MMOL/L (ref 3.4–5.1)
PROT SERPL-MCNC: 7 G/DL (ref 6.4–8.2)
RBC # BLD: 4 MIL/MCL (ref 4–5.2)
SODIUM SERPL-SCNC: 139 MMOL/L (ref 135–145)
TRIGL SERPL-MCNC: 102 MG/DL
WBC # BLD: 4.7 K/MCL (ref 4.2–11)

## 2025-07-25 PROCEDURE — 85025 COMPLETE CBC W/AUTO DIFF WBC: CPT | Performed by: CLINICAL MEDICAL LABORATORY

## 2025-07-25 PROCEDURE — 80053 COMPREHEN METABOLIC PANEL: CPT | Performed by: CLINICAL MEDICAL LABORATORY

## 2025-07-25 PROCEDURE — 83036 HEMOGLOBIN GLYCOSYLATED A1C: CPT | Performed by: CLINICAL MEDICAL LABORATORY

## 2025-07-25 PROCEDURE — 36415 COLL VENOUS BLD VENIPUNCTURE: CPT | Performed by: PHYSICIAN ASSISTANT

## 2025-07-25 PROCEDURE — 80061 LIPID PANEL: CPT | Performed by: CLINICAL MEDICAL LABORATORY

## 2025-07-27 ENCOUNTER — RESULTS FOLLOW-UP (OUTPATIENT)
Age: 75
End: 2025-07-27

## 2025-08-08 ENCOUNTER — NURSE TRIAGE (OUTPATIENT)
Age: 75
End: 2025-08-08

## 2025-08-08 LAB — NONINV COLON CA DNA+OCC BLD SCRN STL QL: NEGATIVE

## 2025-08-11 ENCOUNTER — HOSPITAL ENCOUNTER (OUTPATIENT)
Dept: GENERAL RADIOLOGY | Age: 75
Discharge: HOME | End: 2025-08-11

## 2025-08-11 ENCOUNTER — APPOINTMENT (OUTPATIENT)
Age: 75
End: 2025-08-11

## 2025-08-11 VITALS
HEART RATE: 93 BPM | DIASTOLIC BLOOD PRESSURE: 90 MMHG | WEIGHT: 157.52 LBS | SYSTOLIC BLOOD PRESSURE: 142 MMHG | BODY MASS INDEX: 27.04 KG/M2

## 2025-08-11 DIAGNOSIS — R06.09 DOE (DYSPNEA ON EXERTION): ICD-10-CM

## 2025-08-11 DIAGNOSIS — I10 ESSENTIAL (PRIMARY) HYPERTENSION: ICD-10-CM

## 2025-08-11 DIAGNOSIS — R61 EXCESSIVE SWEATING: ICD-10-CM

## 2025-08-11 DIAGNOSIS — R53.83 FATIGUE, UNSPECIFIED TYPE: Primary | ICD-10-CM

## 2025-08-11 PROCEDURE — 71046 X-RAY EXAM CHEST 2 VIEWS: CPT

## 2025-08-11 RX ORDER — TRAMADOL HYDROCHLORIDE 50 MG/1
TABLET ORAL
COMMUNITY
Start: 2025-07-30

## 2025-08-11 ASSESSMENT — PATIENT HEALTH QUESTIONNAIRE - PHQ9
SUM OF ALL RESPONSES TO PHQ9 QUESTIONS 1 AND 2: 4
10. IF YOU CHECKED OFF ANY PROBLEMS, HOW DIFFICULT HAVE THESE PROBLEMS MADE IT FOR YOU TO DO YOUR WORK, TAKE CARE OF THINGS AT HOME, OR GET ALONG WITH OTHER PEOPLE: EXTREMELY DIFFICULT
9. THOUGHTS THAT YOU WOULD BE BETTER OFF DEAD, OR OF HURTING YOURSELF: SEVERAL DAYS
2. FEELING DOWN, DEPRESSED OR HOPELESS: NEARLY EVERY DAY
SUM OF ALL RESPONSES TO PHQ9 QUESTIONS 1 AND 2: 4
5. POOR APPETITE, WEIGHT LOSS, OR OVEREATING: NEARLY EVERY DAY
6. FEELING BAD ABOUT YOURSELF - OR THAT YOU ARE A FAILURE OR HAVE LET YOURSELF OR YOUR FAMILY DOWN: NEARLY EVERY DAY
7. TROUBLE CONCENTRATING ON THINGS, SUCH AS READING THE NEWSPAPER OR WATCHING TELEVISION: NOT AT ALL
CLINICAL INTERPRETATION OF PHQ9 SCORE: POSITIVE, SEVERE DEPRESSION
8. MOVING OR SPEAKING SO SLOWLY THAT OTHER PEOPLE COULD HAVE NOTICED. OR THE OPPOSITE, BEING SO FIGETY OR RESTLESS THAT YOU HAVE BEEN MOVING AROUND A LOT MORE THAN USUAL: NEARLY EVERY DAY
CLINICAL INTERPRETATION OF PHQ2 SCORE: FURTHER SCREENING NEEDED
3. TROUBLE FALLING OR STAYING ASLEEP OR SLEEPING TOO MUCH: NEARLY EVERY DAY
4. FEELING TIRED OR HAVING LITTLE ENERGY: NEARLY EVERY DAY
1. LITTLE INTEREST OR PLEASURE IN DOING THINGS: SEVERAL DAYS
SUM OF ALL RESPONSES TO PHQ QUESTIONS 1-9: 20

## 2025-08-11 ASSESSMENT — ENCOUNTER SYMPTOMS
FATIGUE: 1
UNEXPECTED WEIGHT CHANGE: 0
FEVER: 0
SHORTNESS OF BREATH: 1
ROS SKIN COMMENTS: +EXCESSIVE SWEATING
COUGH: 0
BACK PAIN: 1

## 2025-08-12 ENCOUNTER — RESULTS FOLLOW-UP (OUTPATIENT)
Age: 75
End: 2025-08-12

## 2025-08-12 LAB
25(OH)D3+25(OH)D2 SERPL-MCNC: 36.6 NG/ML (ref 30–100)
ALBUMIN SERPL-MCNC: 3.4 G/DL (ref 3.4–5)
ALBUMIN/GLOB SERPL: 0.9 {RATIO} (ref 1–2.4)
ALP SERPL-CCNC: 88 UNITS/L (ref 45–117)
ALT SERPL-CCNC: 38 UNITS/L
ANION GAP SERPL CALC-SCNC: 12 MMOL/L (ref 7–19)
AST SERPL-CCNC: 21 UNITS/L
BASOPHILS # BLD: 0 K/MCL (ref 0–0.3)
BASOPHILS NFR BLD: 1 %
BILIRUB SERPL-MCNC: 0.7 MG/DL (ref 0.2–1)
BUN SERPL-MCNC: 27 MG/DL (ref 6–20)
BUN/CREAT SERPL: 39 (ref 7–25)
CALCIUM SERPL-MCNC: 9.4 MG/DL (ref 8.4–10.2)
CHLORIDE SERPL-SCNC: 104 MMOL/L (ref 97–110)
CO2 SERPL-SCNC: 26 MMOL/L (ref 21–32)
CREAT SERPL-MCNC: 0.7 MG/DL (ref 0.51–0.95)
DEPRECATED RDW RBC: 45.1 FL (ref 39–50)
EGFRCR SERPLBLD CKD-EPI 2021: 90 ML/MIN/{1.73_M2}
EOSINOPHIL # BLD: 0 K/MCL (ref 0–0.5)
EOSINOPHIL NFR BLD: 1 %
ERYTHROCYTE [DISTWIDTH] IN BLOOD: 12.3 % (ref 11–15)
FASTING DURATION TIME PATIENT: ABNORMAL H
FERRITIN SERPL-MCNC: 212 NG/ML (ref 8–252)
GLOBULIN SER-MCNC: 3.6 G/DL (ref 2–4)
GLUCOSE SERPL-MCNC: 98 MG/DL (ref 70–99)
HCT VFR BLD CALC: 40.7 % (ref 36–46.5)
HGB BLD-MCNC: 13.6 G/DL (ref 12–15.5)
IMM GRANULOCYTES # BLD AUTO: 0 K/MCL (ref 0–0.2)
IMM GRANULOCYTES # BLD: 0 %
IRON SATN MFR SERPL: 25 % (ref 15–45)
IRON SERPL-MCNC: 78 MCG/DL (ref 50–170)
LYMPHOCYTES # BLD: 1.7 K/MCL (ref 1–4)
LYMPHOCYTES NFR BLD: 25 %
MCH RBC QN AUTO: 33.6 PG (ref 26–34)
MCHC RBC AUTO-ENTMCNC: 33.4 G/DL (ref 32–36.5)
MCV RBC AUTO: 100.5 FL (ref 78–100)
MONOCYTES # BLD: 0.6 K/MCL (ref 0.3–0.9)
MONOCYTES NFR BLD: 9 %
NEUTROPHILS # BLD: 4.3 K/MCL (ref 1.8–7.7)
NEUTROPHILS NFR BLD: 64 %
NRBC BLD MANUAL-RTO: 0 /100 WBC
PLATELET # BLD AUTO: 273 K/MCL (ref 140–450)
POTASSIUM SERPL-SCNC: 3.8 MMOL/L (ref 3.4–5.1)
PROT SERPL-MCNC: 7 G/DL (ref 6.4–8.2)
RBC # BLD: 4.05 MIL/MCL (ref 4–5.2)
SODIUM SERPL-SCNC: 138 MMOL/L (ref 135–145)
TIBC SERPL-MCNC: 315 MCG/DL (ref 250–450)
TSH SERPL-ACNC: 2.99 MCUNITS/ML (ref 0.35–5)
VIT B12 SERPL-MCNC: 327 PG/ML (ref 211–911)
WBC # BLD: 6.6 K/MCL (ref 4.2–11)

## 2026-08-04 ENCOUNTER — APPOINTMENT (OUTPATIENT)
Age: 76
End: 2026-08-04

## (undated) DEVICE — Device

## (undated) DEVICE — DRAPE C ARM MBL XRAY BAND 64X42IN LG EQUIPMENT RBR

## (undated) DEVICE — SOLUTION SURG PRP 26ML 74% ISO ALC 0.7% IOD POVACRYLEX SELF

## (undated) DEVICE — ELECTRODE ESURG BLADE PNCL 10FT BTN SWH CORD HLSTR EDGE PVC

## (undated) DEVICE — GLOVE SURG 6.5 PROTEXIS PI MIC LF CRM PF SMTH BEAD CUFF STRL

## (undated) DEVICE — COVER 72IN THK5.5 MIL HVDTY BACK PAD FNFLD 2 TIER STRL

## (undated) DEVICE — TRAY CATH SURESTEP LUBRI-SIL STLK 16FR 350ML FOLEY URMTR

## (undated) DEVICE — GOWN SURG LG L3 NONREINFORCE SET IN SLV STRL LF DISP BLUE

## (undated) DEVICE — SUTURE VICRYL 1 CP-1 27IN BRAID COAT ABS VIOL J468H

## (undated) DEVICE — CRADLE PSTN DEVON ARM FOAM LMI LF

## (undated) DEVICE — SUTURE VICRYL 2-0 CP-1 27IN BRAID COAT ABS UNDYED J266H

## (undated) DEVICE — SPONGE LAP 18X4IN STRL

## (undated) DEVICE — KIT DRN 18IN 400CC 12.5IN RND 3 SPRG EVAC Y CNCT TUBE HOLE

## (undated) DEVICE — FEE SERVICE IONM OPERATING RM

## (undated) DEVICE — GLOVE SURG 6.5 PROTEXIS LF BLUE PF SMTH BEAD CUFF INTLK STRL

## (undated) DEVICE — GLOVE SURG 6.5 PROTEXIS LF CRM PF BEAD CUFF STRL PLISPRN

## (undated) DEVICE — GLOVE SURG 7 PROTEXIS LF BLUE PF SMTH BEAD CUFF INTLK STRL

## (undated) DEVICE — SLEEVE SURG 3-5.5IN 21.5IN ELASTIC END NONWOVEN POLY

## (undated) DEVICE — SUTURE MONOCRYL MTPS 4-0 PS2 18IN MONO ABS UNDYED

## (undated) DEVICE — CLEANER ESURG TIP 5X5CM DEVON LG ADH BACK CAUT PLSHR RADOPQ

## (undated) DEVICE — WAX BN 2.5GM HMST AGENT STRL LF

## (undated) DEVICE — KIT PSTN CHEST PILLOW GENTLETOUCH SHRGRD DISP SPINE JCKSN

## (undated) DEVICE — GOWN SURG XL L3 NONREINFORCE SET IN SLV STRL LF DISP BLUE

## (undated) DEVICE — DRAPE EXPAND CLPSBL C ARM FLRSCP EQUIPMENT C-ARMOR STRL

## (undated) DEVICE — GLOVE SURG 8 PROTEXIS PI LF CRM PF BEAD CUFF STRL PLISPRN

## (undated) DEVICE — DRAPE ADH STRIP LG TWL 23X17IN SURG STRDRP NS LF DISP CLR

## (undated) DEVICE — DRAPE ADH 51X47IN SURG STRDRP STRL LF DISP CLR

## (undated) DEVICE — STRIP 4X.5IN STRSTRP POLY REINFORCE SKNCLS WHT STRL LF

## (undated) DEVICE — ELECTRODE PT RTN C30- LB 9FT CORD NONIRRITATE NONSENSITIZE